# Patient Record
Sex: FEMALE | HISPANIC OR LATINO | Employment: FULL TIME | URBAN - METROPOLITAN AREA
[De-identification: names, ages, dates, MRNs, and addresses within clinical notes are randomized per-mention and may not be internally consistent; named-entity substitution may affect disease eponyms.]

---

## 2019-01-22 ENCOUNTER — OFFICE VISIT (OUTPATIENT)
Dept: OBGYN | Facility: CLINIC | Age: 54
End: 2019-01-22
Payer: COMMERCIAL

## 2019-01-22 VITALS
WEIGHT: 164.7 LBS | DIASTOLIC BLOOD PRESSURE: 73 MMHG | BODY MASS INDEX: 26.47 KG/M2 | HEART RATE: 88 BPM | SYSTOLIC BLOOD PRESSURE: 106 MMHG | HEIGHT: 66 IN

## 2019-01-22 DIAGNOSIS — R10.2 PELVIC PAIN IN FEMALE: Primary | ICD-10-CM

## 2019-01-22 PROCEDURE — T1013 SIGN LANG/ORAL INTERPRETER: HCPCS | Mod: U3,ZF

## 2019-01-22 PROCEDURE — G0463 HOSPITAL OUTPT CLINIC VISIT: HCPCS

## 2019-01-22 SDOH — HEALTH STABILITY: MENTAL HEALTH: HOW OFTEN DO YOU HAVE A DRINK CONTAINING ALCOHOL?: NEVER

## 2019-01-22 ASSESSMENT — MIFFLIN-ST. JEOR: SCORE: 1378.82

## 2019-01-22 NOTE — NURSING NOTE
Chief Complaint   Patient presents with     Follow Up     pt has abdominal pain was [facility]

## 2019-01-22 NOTE — PATIENT INSTRUCTIONS
It was nice to meet you today. Please schedule a pelvic ultrasound at your convenience. Make follow-up appointment for after your ultrasound.

## 2019-01-22 NOTE — PROGRESS NOTES
Progress Note    SUBJECTIVE:  Marielena Ramirez is a 53 year old,  who is here for pelvic pain and possible ovarian cyst. She was referred from [facility] due to ultrasound findings, although report is unavailable today. She describes 4 years of pelvic pain that originates in the left lower quadrant of her abdomen and radiates across her lower pelvis. She describes vaginal pain as well. She also reports foul smelling discharge with vaginal itching. She is not sexually active due to pain and she reports that her spouse left within the last 3-4 months. She is not currently taking medications for pain or using estrogen cream on her vagina. She finds some relief with warm herbal teas. She reports poor appetite, 3 pound weight loss, and mood disruption due to concerns over her pain. She denies CP, SOB, night sweats, N/V/D, constipation, or urinary symptoms. She reports recent mammogram and colonoscopy at outside facility. She denies abnormal PAP. Her last menses was approximately 1 year ago.     A  assisted with this visit.    Menstrual History:  LMP approximately 1 year ago.    Last  Pap: 4/30/2018 NILM    Last No results found for: HPV16  Last No results found for: HPV18  Last No results found for: HRHPV    Last Mammogram: 10/2017  No evidence of malignancy    Last Colonoscopy: 6/2018  No evidence of malignancy. Repeat in 5 years    HISTORY:    No Known Allergies    There is no immunization history on file for this patient.    OB History   S/p vaginal delivery x5     History reviewed. No pertinent past medical history.  Past Surgical History:   Procedure Laterality Date     TUBAL LIGATION  2008     Family History   Problem Relation Age of Onset     Cancer Mother      Social History     Socioeconomic History     Marital status: Legally      Spouse name: None     Number of children: None     Years of education: None     Highest education level: None   Social Needs     Financial  "resource strain: None     Food insecurity - worry: None     Food insecurity - inability: None     Transportation needs - medical: None     Transportation needs - non-medical: None   Occupational History     None   Tobacco Use     Smoking status: Never Smoker     Smokeless tobacco: Never Used   Substance and Sexual Activity     Alcohol use: No     Frequency: Never     Drug use: No     Sexual activity: Not Currently   Other Topics Concern     None   Social History Narrative     None       ROS  10 point ROS neg other than the symptoms noted above in the HPI.    EXAM:  Blood pressure 106/73, pulse 88, height 1.676 m (5' 6\"), weight 74.7 kg (164 lb 11.2 oz), not currently breastfeeding. Body mass index is 26.58 kg/m .  General - no acute distress.  Skin - no suspicious lesions or rashes  Lungs - no increased work of breathing  Abdomen - soft, tender to palpation in lower quadrants, nondistended, no masses or organomegaly noted.    Pelvic Exam:  EG/BUS: Normal genital architecture without lesions, erythema or abnormal secretions Bartholin's, Urethra, [place]'s normal   Urethral meatus: normal   Urethra: no masses, tenderness, or scarring   Bladder: no masses or tenderness   Vagina: mildly atrophic,with creamy, white and odorless  secretions  Cervix: no lesions  Uterus: retroverted,  and exam findings compromised by body habitus  Adnexa: Within normal limits and No masses, nodularity, mildly tender to palpation  Rectum:anus normal     Wet Prep negative    ASSESSMENT/PLAN: 54yo  with pelvic pain. Per patient has history of ovarian cyst on ultrasound. Patient signed release of information to obtain records from [facility]. Will plan for repeat TVUS with plan for f/u visit for ultrasound results. Patient with thorough previous workup for GI etiology of pain negative. Patient with significant stress regarding fear of something wrong. Patient demonstrates understanding of plan and will schedule US and f/u visit. "       Encounter Diagnosis   Name Primary?     Pelvic pain in female Yes        Orders Placed This Encounter   Procedures     US Pelvic Complete w Transvaginal      Patient discussed with Dr. Darnell Alicia Myhre, DO  Obstetrics and Gyncology, PGY-2  January 22, 2019 , 8:15 PM     The Patient was seen in [facility] by    MYHRE, ALICIA REYES, REBECCA.  I reviewed the history & exam. Assessment and plan were jointly made.    Olinda Arechiga MD

## 2019-04-17 ENCOUNTER — ANCILLARY PROCEDURE (OUTPATIENT)
Dept: ULTRASOUND IMAGING | Facility: CLINIC | Age: 54
End: 2019-04-17
Payer: COMMERCIAL

## 2019-04-17 ENCOUNTER — OFFICE VISIT (OUTPATIENT)
Dept: OBGYN | Facility: CLINIC | Age: 54
End: 2019-04-17
Payer: COMMERCIAL

## 2019-04-17 VITALS
SYSTOLIC BLOOD PRESSURE: 116 MMHG | DIASTOLIC BLOOD PRESSURE: 73 MMHG | HEART RATE: 76 BPM | WEIGHT: 165 LBS | HEIGHT: 66 IN | BODY MASS INDEX: 26.52 KG/M2

## 2019-04-17 DIAGNOSIS — R10.2 PELVIC PAIN IN FEMALE: Primary | ICD-10-CM

## 2019-04-17 PROCEDURE — G0463 HOSPITAL OUTPT CLINIC VISIT: HCPCS | Mod: 25,ZF

## 2019-04-17 PROCEDURE — T1013 SIGN LANG/ORAL INTERPRETER: HCPCS | Mod: U3,ZF

## 2019-04-17 PROCEDURE — 76830 TRANSVAGINAL US NON-OB: CPT

## 2019-04-17 RX ORDER — OMEPRAZOLE 20 MG/1
20 TABLET, DELAYED RELEASE ORAL
COMMUNITY
Start: 2018-11-06 | End: 2019-04-17

## 2019-04-17 ASSESSMENT — MIFFLIN-ST. JEOR: SCORE: 1380.19

## 2019-04-17 ASSESSMENT — PAIN SCALES - GENERAL: PAINLEVEL: SEVERE PAIN (6)

## 2019-04-17 NOTE — PATIENT INSTRUCTIONS
Nice to meet you today!  Your pelvic ultrasound was reassuring.   Please follow up in clinic as needed.

## 2019-04-17 NOTE — PROGRESS NOTES
"[facility]  Follow-Up Visit    S: Ms. Marielena Ramirez is a 53 year old  who presents today to discuss the results of a pelvic ultrasound performed due to pelvic pain. She reports her pain is improved. When she has pain she takes tylenol and drinks water with improvement in pain. No other concerns or complaints today.    An in person  was used for this encounter.     O:  /73   Pulse 76   Ht 1.676 m (5' 6\")   Wt 74.8 kg (165 lb)   Breastfeeding? No   BMI 26.63 kg/m    Gen: Well-appearing, NAD  HEENT: Normocephalic, atraumatic  CV:  Well perfused  Pulm: No increased work of breathing    A/P:  Ms. Marielena Ramirez is a 53 year old  who presents to dicussed pelvic US performed for pelvic pain.      Pelvic US was reassuring. A bulky uterus with possible adenomyosis was noted. Discussed that adenomyosis can cause discomfort during menses. Patient was reassured by overall normal pelvic ultrasound. She had no other complaints or concerns. She was encouraged to follow up in clinic as needed.     Patient to follow up in clinic as needed.     Staffed with Dr. Parra.    Magda Fragoso MD PhD  Ob/Gyn, PGY-3  4/17/2019, 3:26 PM    The Patient was seen in [facility] by    MAGDA FRAGOSO JUDITH.  I reviewed the history & exam. Assessment and plan were jointly made.    Allie Parra MD      "

## 2021-12-16 ENCOUNTER — APPOINTMENT (OUTPATIENT)
Dept: INTERPRETER SERVICES | Facility: CLINIC | Age: 56
End: 2021-12-16
Payer: COMMERCIAL

## 2021-12-27 ENCOUNTER — PRE VISIT (OUTPATIENT)
Dept: UROLOGY | Facility: CLINIC | Age: 56
End: 2021-12-27
Payer: COMMERCIAL

## 2021-12-28 NOTE — TELEPHONE ENCOUNTER
MEDICAL RECORDS REQUEST   [facility]  Urology Clinic  [address] [place] [address]  PHONE: [phone]  Fax: [phone]        FUTURE VISIT INFORMATION                                                   Marielena Ramirez, : 1965 scheduled for future visit at [facility]    APPOINTMENT INFORMATION:    Date: 2022    Provider:  Renetta Briones MD    Reason for Visit/Diagnosis: Bladder cyst    REFERRAL INFORMATION:    Referring provider: N/A    Specialty: N/A    Referring providers clinic:  N/A    Clinic contact number:  N/A    RECORDS REQUESTED FOR VISIT                                                     NOTES  STATUS/DETAILS   OFFICE NOTE from referring provider  no   OFFICE NOTE from other specialist  yes, Byron Parada MD     DISCHARGE SUMMARY from hospital  no   DISCHARGE REPORT from the ER  yes, 10/27/2021, 2021   OPERATIVE REPORT  yes, 2021   MEDICATION LIST  yes   LABS     URINALYSIS (UA)  yes   URINE CYTOLOGY  no   IMAGING (IMAGES & REPORT)  pending  2021, 10/27/2021, 2021, 2020 -- CT ABD/P -- [facility]    2021 -- MR Pelvis -- [facility]    2021 -- CT ABD/P -- [facility]     PRE-VISIT CHECKLIST      Record collection complete If no, please explain pending   Appointment appropriately scheduled           (right time/right provider) Yes   Joint diagnostic appointment coordinated correctly          (ensure right order & amount of time) Yes   MyChart activation No   Questionnaire complete If no, please explain pending     Action 2021 KATARINATNATE 8:44am   Action Taken CSS sent a request to [facility] for images to be pushed to [facility].

## 2021-12-30 ENCOUNTER — PRE VISIT (OUTPATIENT)
Dept: UROLOGY | Facility: CLINIC | Age: 56
End: 2021-12-30
Payer: COMMERCIAL

## 2021-12-30 NOTE — TELEPHONE ENCOUNTER
Reason for visit: Urethral diverticulum, mixed incontinence      Relevant information: cystic appearing bladder seen on cystoscopy by referring provider    Records/imaging/labs/orders: records available    Pt called: no need for a call    At Rooming: collect a urine/pvr

## 2022-01-20 ENCOUNTER — DOCUMENTATION ONLY (OUTPATIENT)
Dept: UROLOGY | Facility: CLINIC | Age: 57
End: 2022-01-20

## 2022-01-20 ENCOUNTER — OFFICE VISIT (OUTPATIENT)
Dept: UROLOGY | Facility: CLINIC | Age: 57
End: 2022-01-20
Payer: COMMERCIAL

## 2022-01-20 VITALS
WEIGHT: 175 LBS | DIASTOLIC BLOOD PRESSURE: 79 MMHG | HEIGHT: 67 IN | HEART RATE: 72 BPM | SYSTOLIC BLOOD PRESSURE: 123 MMHG | BODY MASS INDEX: 27.47 KG/M2

## 2022-01-20 DIAGNOSIS — N36.1 URETHRAL DIVERTICULUM: ICD-10-CM

## 2022-01-20 DIAGNOSIS — M62.89 PELVIC FLOOR DYSFUNCTION IN FEMALE: ICD-10-CM

## 2022-01-20 DIAGNOSIS — N32.9 LESION OF BLADDER: ICD-10-CM

## 2022-01-20 DIAGNOSIS — N39.46 MIXED INCONTINENCE: Primary | ICD-10-CM

## 2022-01-20 DIAGNOSIS — M79.18 MYALGIA OF PELVIC FLOOR: ICD-10-CM

## 2022-01-20 LAB
ALBUMIN UR-MCNC: NEGATIVE MG/DL
APPEARANCE UR: CLEAR
BILIRUB UR QL STRIP: NEGATIVE
COLOR UR AUTO: YELLOW
GLUCOSE UR STRIP-MCNC: NEGATIVE MG/DL
HGB UR QL STRIP: NEGATIVE
KETONES UR STRIP-MCNC: NEGATIVE MG/DL
LEUKOCYTE ESTERASE UR QL STRIP: ABNORMAL
NITRATE UR QL: NEGATIVE
PH UR STRIP: 7 [PH] (ref 5–8)
SP GR UR STRIP: >=1.03 (ref 1–1.03)
UROBILINOGEN UR STRIP-ACNC: 0.2 E.U./DL

## 2022-01-20 PROCEDURE — 51798 US URINE CAPACITY MEASURE: CPT | Performed by: UROLOGY

## 2022-01-20 PROCEDURE — 99203 OFFICE O/P NEW LOW 30 MIN: CPT | Mod: 25 | Performed by: UROLOGY

## 2022-01-20 PROCEDURE — 52000 CYSTOURETHROSCOPY: CPT | Performed by: UROLOGY

## 2022-01-20 RX ORDER — IBUPROFEN 600 MG/1
600 TABLET, FILM COATED ORAL
COMMUNITY
Start: 2021-12-20

## 2022-01-20 RX ORDER — CEFAZOLIN SODIUM 2 G/50ML
2 SOLUTION INTRAVENOUS
Status: CANCELLED | OUTPATIENT
Start: 2022-01-20

## 2022-01-20 RX ORDER — CEFAZOLIN SODIUM 2 G/50ML
2 SOLUTION INTRAVENOUS SEE ADMIN INSTRUCTIONS
Status: CANCELLED | OUTPATIENT
Start: 2022-01-20

## 2022-01-20 ASSESSMENT — PAIN SCALES - GENERAL: PAINLEVEL: EXTREME PAIN (8)

## 2022-01-20 ASSESSMENT — MIFFLIN-ST. JEOR: SCORE: 1426.42

## 2022-01-20 NOTE — NURSING NOTE
"Chief Complaint   Patient presents with     Consult     Urethral diverticulum, mixed incontinence         Blood pressure 123/79, pulse 72, height 1.702 m (5' 7\"), weight 79.4 kg (175 lb), not currently breastfeeding. Body mass index is 27.41 kg/m .    There is no problem list on file for this patient.      Allergies   Allergen Reactions     Fish-Derived Products Itching       Current Outpatient Medications   Medication Sig Dispense Refill     ibuprofen (ADVIL/MOTRIN) 600 MG tablet Take 600 mg by mouth         Social History     Tobacco Use     Smoking status: Never Smoker     Smokeless tobacco: Never Used   Substance Use Topics     Alcohol use: No     Drug use: No       Rose Jimenez, EMT  1/20/2022  8:10 AM  "

## 2022-01-20 NOTE — TELEPHONE ENCOUNTER
FUTURE VISIT INFORMATION      SURGERY INFORMATION:    Date: 22    Location: [facility] or    Surgeon:  Renetta Briones MD    Anesthesia Type:  choice    Procedure: EXAM UNDER ANESTHESIA, CYSTOSCOPY, WITH BLADDER BIOPSY    Consult: ov 22    RECORDS REQUESTED FROM:       Primary Care Provider: Solitario Rivera MD  [facility]    Most recent EKG+ Tracin21- [facility]

## 2022-01-20 NOTE — LETTER
1/20/2022       RE: Marielena Ramirez  [address] [facility] [address]     Dear Colleague,    Thank you for referring your patient, Marielena Ramirez, to the [facility] [place] at [facility]. Please see a copy of my visit note below.    January 20, 2022    Referring Provider: Byron Parada MD  [address] [place] [address]    Primary Care Provider: No Ref-Primary, Physician    Assessment & Plan     Mixed incontinence  We discussed the etiologies of stress and urge incontinence and how they differ. We discussed that the options of treating stress incontinence to include observation, weight loss, pelvic floor physical therapy, incontinence pessary, urethral bulking agents, and surgical correction most commonly with midurethral sling or autologous rectus fascial sling. We then discussed that urge incontinence treatments include observation, weight loss, medications most commonly anticholinergics, physical therapy, biofeedback, intravesical botulinum toxin, percutaneous tibial nerve stimulation and sacral neuromodulation.     We discussed she is not a candidate for mesh sling at the time of a diverticula repair. Although she is very interested in a fascial sling at the time of a diverticulectomy repair given her exam findings and the fact that this diverticula may be more challenging given the location discussed that I am leaning towards staging procedures if needed    - Case Request: EXAM UNDER ANESTHESIA, CYSTOSCOPY, WITH BLADDER BIOPSY  - Case Request: EXAM UNDER ANESTHESIA, CYSTOSCOPY, WITH BLADDER BIOPSY    Urethral diverticulum    On exam and cystoscopy today, unable to visualize lumen of urethral diverticulum and unable to palpate diverticulum on pelvic exam. Will plan to have pt f/u for repeat cystoscopy and pelvic exam under anesthesia to better identify the diverticulum. Following that, could consider repeat MRI to  see if urethral diverticulum resolved or plan for surgical management of urethral diverticulum.    - Case Request: EXAM UNDER ANESTHESIA, CYSTOSCOPY, WITH BLADDER BIOPSY  - Case Request: EXAM UNDER ANESTHESIA, CYSTOSCOPY, WITH BLADDER BIOPSY    Myalgia of pelvic floor/Pelvic floor dysfunction in female    We discussed how her pelvic floor symptoms are related to the physical exam findings and her pelvic floor myofascial dysfunction.  We discussed how the recommended treatment is dedicated pelvic floor therapy but she wishes to hold off for now until after the procedure    - Case Request: EXAM UNDER ANESTHESIA, CYSTOSCOPY, WITH BLADDER BIOPSY  - Case Request: EXAM UNDER ANESTHESIA, CYSTOSCOPY, WITH BLADDER BIOPSY    Lesion of bladder    Since we are planning on going to the OR for a better exam will also plan to do bladder biopsy at that time to ensure no malignancy    - Case Request: EXAM UNDER ANESTHESIA, CYSTOSCOPY, WITH BLADDER BIOPSY  - Case Request: EXAM UNDER ANESTHESIA, CYSTOSCOPY, WITH BLADDER BIOPSY    Shari Dowling, MS4    40 minutes were spent today on the date of the encounter in reviewing the EMR including Dr Parada's notes from [facility], MRI results from [facility], direct patient care, coordination of care including surgical counseling and documentation in addition to the cystoscopy procedure    Renetta Briones MD MPH  (she/her/hers)   of Urology  [facility]      HPI:  Marielena Ramirez is a 56 year old female  (5xNSVD) with PMH of recurrent UTIs, GERD, vaginal atrophy, s/p umbilical hernia repair who presents for evaluation of her pelvic floor symptoms.  She was previously seen by urology on 12/15/21 and was referred to [facility] urology for further workup.  Patient is primarily [place] Islander speaking and the entire visit is conducted with the assistance of a     On 12/15/21 cystoscopy, the pt was found to have probably 50-60 isolated small 2mm lesions across  the surface of the bladder that look like cystitis cystica per Dr. Parada. They were not biopsied.    An MRI done on 11/2021, showed:   Impression: Large urethral diverticulum, with thick nodular wall showing    intense enhancement and restricted diffusion. Findings are concerning for    chronic infected/inflamed urethral diverticulum, versus malignancy.   The pt was then referred to [facility] urology d/t the complex nature of the diverticulum with a doughnut configuration wrapping dorsally around part of the urethra and may require a labial flap for urethral coverage.    Recurrent UTIs  5-6 UTIs in the past year, last UTI was one month ago. She is not using preventative antibiotics. She is rarely sexually active and does not notice association between symptom onset and UTI symptoms. She gets UTIs after eating fish.     Urinary incontinence  The pt has urinary leakage when laughing, coughing, and sneezing. She also has pain and burning when urinating associated with pressure in her lower abdomen. She notices blood in her urine about 3x/week and is about a few drops. She gets an urge to urinate when she drink water. Nocturia 3-4x/night. Urinates 8-9x during the day. She wears a thick menstrual pad and changes it about 3x a day. She notes the pads cause a rash and itching.    Vaginal Symptoms  She has a white discharge with a bad odor and associated itching that occurs when she is not taking antibiotics. She also has vaginal pressure and a bulge.    GI  The pt has a BM 3-4x/day which she reports is loose. She has constipation about once a week. She takes a laxative when she has constipation, unsure which laxative. No hematochezia, melena, and fecal incontinence.    Health maintenance screening:  Pap smear - pt reports last pap smear was 5 years ago at outside clinic, normal  Colonoscopy - never  Mammogram - never     No past medical history on file.    Past Surgical History:   Procedure Laterality Date     TUBAL LIGATION   "2008       Social History     Socioeconomic History     Marital status: Legally      Spouse name: Not on file     Number of children: Not on file     Years of education: Not on file     Highest education level: Not on file   Occupational History     Not on file   Tobacco Use     Smoking status: Never Smoker     Smokeless tobacco: Never Used   Substance and Sexual Activity     Alcohol use: No     Drug use: No     Sexual activity: Not Currently   Other Topics Concern     Not on file   Social History Narrative     Not on file     Social Determinants of Health     Financial Resource Strain: Not on file   Food Insecurity: Not on file   Transportation Needs: Not on file   Physical Activity: Not on file   Stress: Not on file   Social Connections: Not on file   Intimate Partner Violence: Not on file   Housing Stability: Not on file       Family History   Problem Relation Age of Onset     Cancer Mother        ROS    Allergies   Allergen Reactions     Fish-Derived Products Itching       Current Outpatient Medications   Medication     ibuprofen (ADVIL/MOTRIN) 600 MG tablet     No current facility-administered medications for this visit.     /79   Pulse 72   Ht 1.702 m (5' 7\")   Wt 79.4 kg (175 lb)   BMI 27.41 kg/m      GENERAL: healthy, alert and no distress  RESP: breathing comfortably on room air  CV: well perfused   (female): anterior vaginal fullness with no discernable urethral diverticula, diffuse myofacial tenderness and high tone pelvic floor, normal female external genitalia, normal urethral meatus, vaginal mucosa, normal cervix/adnexa/uterus without masses or discharge  MS: no gross musculoskeletal defects noted, no edema    Cystoscopy Note: Given that she had to take time off today for the appointment and is really interested in surgery we elected to perform the cystoscopy today.  After informed consent was obtained patient was prepped and draped in the standard fashion.  The flexible cystoscope " was inserted into a normal appearing urethral meatus.  The urothelium was carefully examined and there was some trigone irregularity but no obviosu tumors, masses, stones, foreign bodies, or other urothelial abnormalities noted.  Bilateral ureteral orifices were noted in the normal orthotopic position and both effluxed clear urine.  The cystoscope was retroflexed and the bladder neck was unremarkable.  The urethra was carefully examined upon removing the cystoscope and there was no obvious diverticular os.  Patient tolerated the procedure without complications noted.      Urine dip normal, trace leuk est     PVR 0 mL by bladder scan      CC  Patient Care Team:  No Ref-Primary, Physician as PCP - General  Myhre, Alicia, DO as Resident (Student in organized health care education/training program)  Renetta Briones MD as MD (Urology)  MARIA A MCQUEEN

## 2022-01-20 NOTE — PROGRESS NOTES
Action 01/20/2022 JTV 9:24am   Action Taken CSS sent an urgent request to [facility] for images to be pushed.

## 2022-01-20 NOTE — PROGRESS NOTES
January 20, 2022    Referring Provider: Byron Parada MD  [address] [place] [address]    Primary Care Provider: No Ref-Primary, Physician    Assessment & Plan     Mixed incontinence  We discussed the etiologies of stress and urge incontinence and how they differ. We discussed that the options of treating stress incontinence to include observation, weight loss, pelvic floor physical therapy, incontinence pessary, urethral bulking agents, and surgical correction most commonly with midurethral sling or autologous rectus fascial sling. We then discussed that urge incontinence treatments include observation, weight loss, medications most commonly anticholinergics, physical therapy, biofeedback, intravesical botulinum toxin, percutaneous tibial nerve stimulation and sacral neuromodulation.     We discussed she is not a candidate for mesh sling at the time of a diverticula repair. Although she is very interested in a fascial sling at the time of a diverticulectomy repair given her exam findings and the fact that this diverticula may be more challenging given the location discussed that I am leaning towards staging procedures if needed    - Case Request: EXAM UNDER ANESTHESIA, CYSTOSCOPY, WITH BLADDER BIOPSY  - Case Request: EXAM UNDER ANESTHESIA, CYSTOSCOPY, WITH BLADDER BIOPSY    Urethral diverticulum    On exam and cystoscopy today, unable to visualize lumen of urethral diverticulum and unable to palpate diverticulum on pelvic exam. Will plan to have pt f/u for repeat cystoscopy and pelvic exam under anesthesia to better identify the diverticulum. Following that, could consider repeat MRI to see if urethral diverticulum resolved or plan for surgical management of urethral diverticulum.    - Case Request: EXAM UNDER ANESTHESIA, CYSTOSCOPY, WITH BLADDER BIOPSY  - Case Request: EXAM UNDER ANESTHESIA, CYSTOSCOPY, WITH BLADDER BIOPSY    Myalgia of pelvic floor/Pelvic floor dysfunction in female    We discussed  how her pelvic floor symptoms are related to the physical exam findings and her pelvic floor myofascial dysfunction.  We discussed how the recommended treatment is dedicated pelvic floor therapy but she wishes to hold off for now until after the procedure    - Case Request: EXAM UNDER ANESTHESIA, CYSTOSCOPY, WITH BLADDER BIOPSY  - Case Request: EXAM UNDER ANESTHESIA, CYSTOSCOPY, WITH BLADDER BIOPSY    Lesion of bladder    Since we are planning on going to the OR for a better exam will also plan to do bladder biopsy at that time to ensure no malignancy    - Case Request: EXAM UNDER ANESTHESIA, CYSTOSCOPY, WITH BLADDER BIOPSY  - Case Request: EXAM UNDER ANESTHESIA, CYSTOSCOPY, WITH BLADDER BIOPSY    Shari Dowling, MS4    40 minutes were spent today on the date of the encounter in reviewing the EMR including Dr Parada's notes from [facility], MRI results from [facility], direct patient care, coordination of care including surgical counseling and documentation in addition to the cystoscopy procedure    Renetta Briones MD MPH  (she/her/hers)   of Urology  [facility]      HPI:  Marielena Ramirez is a 56 year old female  (5xNSVD) with PMH of recurrent UTIs, GERD, vaginal atrophy, s/p umbilical hernia repair who presents for evaluation of her pelvic floor symptoms.  She was previously seen by urology on 12/15/21 and was referred to [facility] urology for further workup.  Patient is primarily Greek speaking and the entire visit is conducted with the assistance of a     On 12/15/21 cystoscopy, the pt was found to have probably 50-60 isolated small 2mm lesions across the surface of the bladder that look like cystitis cystica per Dr. Parada. They were not biopsied.    An MRI done on 2021, showed:   Impression: Large urethral diverticulum, with thick nodular wall showing    intense enhancement and restricted diffusion. Findings are concerning for    chronic infected/inflamed  urethral diverticulum, versus malignancy.   The pt was then referred to [facility] urology d/t the complex nature of the diverticulum with a doughnut configuration wrapping dorsally around part of the urethra and may require a labial flap for urethral coverage.    Recurrent UTIs  5-6 UTIs in the past year, last UTI was one month ago. She is not using preventative antibiotics. She is rarely sexually active and does not notice association between symptom onset and UTI symptoms. She gets UTIs after eating fish.     Urinary incontinence  The pt has urinary leakage when laughing, coughing, and sneezing. She also has pain and burning when urinating associated with pressure in her lower abdomen. She notices blood in her urine about 3x/week and is about a few drops. She gets an urge to urinate when she drink water. Nocturia 3-4x/night. Urinates 8-9x during the day. She wears a thick menstrual pad and changes it about 3x a day. She notes the pads cause a rash and itching.    Vaginal Symptoms  She has a white discharge with a bad odor and associated itching that occurs when she is not taking antibiotics. She also has vaginal pressure and a bulge.    GI  The pt has a BM 3-4x/day which she reports is loose. She has constipation about once a week. She takes a laxative when she has constipation, unsure which laxative. No hematochezia, melena, and fecal incontinence.    Health maintenance screening:  Pap smear - pt reports last pap smear was 5 years ago at outside clinic, normal  Colonoscopy - never  Mammogram - never     No past medical history on file.    Past Surgical History:   Procedure Laterality Date     TUBAL LIGATION  2008       Social History     Socioeconomic History     Marital status: Legally      Spouse name: Not on file     Number of children: Not on file     Years of education: Not on file     Highest education level: Not on file   Occupational History     Not on file   Tobacco Use     Smoking status: Never  "Smoker     Smokeless tobacco: Never Used   Substance and Sexual Activity     Alcohol use: No     Drug use: No     Sexual activity: Not Currently   Other Topics Concern     Not on file   Social History Narrative     Not on file     Social Determinants of Health     Financial Resource Strain: Not on file   Food Insecurity: Not on file   Transportation Needs: Not on file   Physical Activity: Not on file   Stress: Not on file   Social Connections: Not on file   Intimate Partner Violence: Not on file   Housing Stability: Not on file       Family History   Problem Relation Age of Onset     Cancer Mother        ROS    Allergies   Allergen Reactions     Fish-Derived Products Itching       Current Outpatient Medications   Medication     ibuprofen (ADVIL/MOTRIN) 600 MG tablet     No current facility-administered medications for this visit.     /79   Pulse 72   Ht 1.702 m (5' 7\")   Wt 79.4 kg (175 lb)   BMI 27.41 kg/m      GENERAL: healthy, alert and no distress  RESP: breathing comfortably on room air  CV: well perfused   (female): anterior vaginal fullness with no discernable urethral diverticula, diffuse myofacial tenderness and high tone pelvic floor, normal female external genitalia, normal urethral meatus, vaginal mucosa, normal cervix/adnexa/uterus without masses or discharge  MS: no gross musculoskeletal defects noted, no edema    Cystoscopy Note: Given that she had to take time off today for the appointment and is really interested in surgery we elected to perform the cystoscopy today.  After informed consent was obtained patient was prepped and draped in the standard fashion.  The flexible cystoscope was inserted into a normal appearing urethral meatus.  The urothelium was carefully examined and there was some trigone irregularity but no obviosu tumors, masses, stones, foreign bodies, or other urothelial abnormalities noted.  Bilateral ureteral orifices were noted in the normal orthotopic position and " both effluxed clear urine.  The cystoscope was retroflexed and the bladder neck was unremarkable.  The urethra was carefully examined upon removing the cystoscope and there was no obvious diverticular os.  Patient tolerated the procedure without complications noted.      Urine dip normal, trace leuk est     PVR 0 mL by bladder scan      CC  Patient Care Team:  No Ref-Primary, Physician as PCP - General  Myhre, Alicia, DO as Resident (Student in organized health care education/training program)  Renetta Briones MD as MD (Urology)  MARIA A MCQUEEN

## 2022-01-26 DIAGNOSIS — Z11.59 ENCOUNTER FOR SCREENING FOR OTHER VIRAL DISEASES: Primary | ICD-10-CM

## 2022-01-31 ENCOUNTER — ANESTHESIA EVENT (OUTPATIENT)
Dept: SURGERY | Facility: CLINIC | Age: 57
End: 2022-01-31

## 2022-01-31 ENCOUNTER — PRE VISIT (OUTPATIENT)
Dept: SURGERY | Facility: CLINIC | Age: 57
End: 2022-01-31

## 2022-01-31 DIAGNOSIS — Z01.812 PRE-PROCEDURE LAB EXAM: Primary | ICD-10-CM

## 2022-01-31 NOTE — ANESTHESIA PREPROCEDURE EVALUATION
Anesthesia Pre-Procedure Evaluation    Patient: Marielena Ramirez   MRN: [id number] : 1965        Preoperative Diagnosis: * No surgery found *    Procedure :   PAC EVALUATION       Past Medical History:   Diagnosis Date     Recurrent UTI       Past Surgical History:   Procedure Laterality Date     HERNIA REPAIR       TUBAL LIGATION  2008      Allergies   Allergen Reactions     Fish-Derived Products Itching      Social History     Tobacco Use     Smoking status: Never Smoker     Smokeless tobacco: Never Used   Substance Use Topics     Alcohol use: No      Wt Readings from Last 1 Encounters:   22 79.4 kg (175 lb)        Anesthesia Evaluation   Pt has had prior anesthetic.         ROS/MED HX  ENT/Pulmonary:       Neurologic:       Cardiovascular:     (+) -----Previous cardiac testing   Echo: Date: Results:    Stress Test: Date: Results:    ECG Reviewed: Date: 21 Results:  SB  Cath: Date: Results:      METS/Exercise Tolerance:     Hematologic:       Musculoskeletal:       GI/Hepatic:       Renal/Genitourinary: Comment: Recurrent uti    incontinence      Endo:       Psychiatric/Substance Use:       Infectious Disease:       Malignancy:       Other:               OUTSIDE LABS:  CBC: No results found for: WBC, HGB, HCT, PLT  BMP: No results found for: NA, POTASSIUM, CHLORIDE, CO2, BUN, CR, GLC  COAGS: No results found for: PTT, INR, FIBR  POC: No results found for: BGM, HCG, HCGS  HEPATIC: No results found for: ALBUMIN, PROTTOTAL, ALT, AST, GGT, ALKPHOS, BILITOTAL, BILIDIRECT, LIZETTE  OTHER: No results found for: PH, LACT, A1C, BERE, PHOS, MAG, LIPASE, AMYLASE, TSH, T4, T3, CRP, SED          PAC Discussion and Assessment                          PAC Resident/NP Anesthesia Assessment: Opened in error.  Pt did not show up for PAC evaluation.                                         Caryn Dick PA-C

## 2022-01-31 NOTE — TELEPHONE ENCOUNTER
FUTURE VISIT INFORMATION        SURGERY INFORMATION:    Date: 22    [facility]    Surgeon:  Renetta Briones MD    Anesthesia Type:  choice    Procedure: EXAM UNDER ANESTHESIA, CYSTOSCOPY, WITH BLADDER BIOPSY    Consult: ov 22     RECORDS REQUESTED FROM:         Primary Care Provider: Solitario Rivera MD  [facility]     Most recent EKG+ Tracin21- [facility]

## 2022-02-01 ENCOUNTER — PATIENT OUTREACH (OUTPATIENT)
Dept: UROLOGY | Facility: CLINIC | Age: 57
End: 2022-02-01
Payer: COMMERCIAL

## 2022-02-01 NOTE — TELEPHONE ENCOUNTER
Left message to have the patient call me. I want to know if she completed a H & P at her primary's office since she missed her PAC visit yesterday.    Assistance provided by .    Sheri Ospina RN, BSN  Care Coordinator Urology

## 2022-02-03 ENCOUNTER — PRE VISIT (OUTPATIENT)
Dept: SURGERY | Facility: CLINIC | Age: 57
End: 2022-02-03
Payer: COMMERCIAL

## 2022-02-03 ENCOUNTER — LAB (OUTPATIENT)
Dept: LAB | Facility: CLINIC | Age: 57
End: 2022-02-03
Attending: UROLOGY
Payer: COMMERCIAL

## 2022-02-03 ENCOUNTER — ANESTHESIA EVENT (OUTPATIENT)
Dept: SURGERY | Facility: AMBULATORY SURGERY CENTER | Age: 57
End: 2022-02-03
Payer: COMMERCIAL

## 2022-02-03 ENCOUNTER — OFFICE VISIT (OUTPATIENT)
Dept: SURGERY | Facility: CLINIC | Age: 57
End: 2022-02-03
Payer: COMMERCIAL

## 2022-02-03 VITALS
DIASTOLIC BLOOD PRESSURE: 77 MMHG | BODY MASS INDEX: 26.68 KG/M2 | RESPIRATION RATE: 16 BRPM | SYSTOLIC BLOOD PRESSURE: 112 MMHG | TEMPERATURE: 97.9 F | HEART RATE: 79 BPM | HEIGHT: 67 IN | OXYGEN SATURATION: 97 % | WEIGHT: 170 LBS

## 2022-02-03 DIAGNOSIS — Z01.812 PRE-PROCEDURE LAB EXAM: ICD-10-CM

## 2022-02-03 DIAGNOSIS — Z11.59 ENCOUNTER FOR SCREENING FOR OTHER VIRAL DISEASES: ICD-10-CM

## 2022-02-03 DIAGNOSIS — Z01.818 PREOP EXAMINATION: Primary | ICD-10-CM

## 2022-02-03 LAB
ALBUMIN UR-MCNC: NEGATIVE MG/DL
APPEARANCE UR: CLEAR
BILIRUB UR QL STRIP: NEGATIVE
COLOR UR AUTO: YELLOW
GLUCOSE UR STRIP-MCNC: NEGATIVE MG/DL
HGB UR QL STRIP: NEGATIVE
KETONES UR STRIP-MCNC: NEGATIVE MG/DL
LEUKOCYTE ESTERASE UR QL STRIP: ABNORMAL
MUCOUS THREADS #/AREA URNS LPF: PRESENT /LPF
NITRATE UR QL: NEGATIVE
PH UR STRIP: 7 [PH] (ref 5–7)
RBC URINE: 1 /HPF
SP GR UR STRIP: 1.01 (ref 1–1.03)
SQUAMOUS EPITHELIAL: 2 /HPF
UROBILINOGEN UR STRIP-MCNC: NORMAL MG/DL
WBC URINE: 4 /HPF

## 2022-02-03 PROCEDURE — U0005 INFEC AGEN DETEC AMPLI PROBE: HCPCS | Performed by: UROLOGY

## 2022-02-03 PROCEDURE — 99204 OFFICE O/P NEW MOD 45 MIN: CPT | Performed by: PHYSICIAN ASSISTANT

## 2022-02-03 PROCEDURE — 81001 URINALYSIS AUTO W/SCOPE: CPT | Performed by: PATHOLOGY

## 2022-02-03 PROCEDURE — 87086 URINE CULTURE/COLONY COUNT: CPT | Performed by: UROLOGY

## 2022-02-03 PROCEDURE — T1013 SIGN LANG/ORAL INTERPRETER: HCPCS | Mod: U3

## 2022-02-03 RX ORDER — ACETAMINOPHEN 325 MG/1
325-650 TABLET ORAL EVERY 6 HOURS PRN
COMMUNITY

## 2022-02-03 ASSESSMENT — MIFFLIN-ST. JEOR: SCORE: 1403.74

## 2022-02-03 ASSESSMENT — ENCOUNTER SYMPTOMS: SEIZURES: 0

## 2022-02-03 ASSESSMENT — PAIN SCALES - GENERAL: PAINLEVEL: SEVERE PAIN (7)

## 2022-02-03 ASSESSMENT — LIFESTYLE VARIABLES: TOBACCO_USE: 0

## 2022-02-03 NOTE — ANESTHESIA PREPROCEDURE EVALUATION
Anesthesia Pre-Procedure Evaluation    Patient: Marielena Ramirez   MRN: [id number] : 1965        Preoperative Diagnosis: Mixed incontinence [N39.46]  Urethral diverticulum [N36.1]  Myalgia of pelvic floor [M79.18]  Pelvic floor dysfunction in female [M62.89]  Lesion of bladder [N32.9]    Procedure : Procedure(s):  EXAM UNDER ANESTHESIA, CYSTOSCOPY, WITH BLADDER BIOPSY  PAC EVALUATION       Past Medical History:   Diagnosis Date     Gastroesophageal reflux disease without esophagitis      Lesion of bladder      Mixed incontinence      Myalgia of pelvic floor      Pelvic floor dysfunction in female      Recurrent UTI      Umbilical hernia without obstruction and without gangrene      Urethral diverticulum      Vaginal atrophy       Past Surgical History:   Procedure Laterality Date     CHOLECYSTECTOMY      10/2021     HERNIA REPAIR  10/25/2021    Umbilical hernia     TUBAL LIGATION        Allergies   Allergen Reactions     Fish-Derived Products Itching      Social History     Tobacco Use     Smoking status: Never Smoker     Smokeless tobacco: Never Used   Substance Use Topics     Alcohol use: No      Wt Readings from Last 1 Encounters:   22 77.1 kg (170 lb)        Anesthesia Evaluation   Pt has had prior anesthetic.     History of anesthetic complications  - PONV.      ROS/MED HX  ENT/Pulmonary:  - neg pulmonary ROS  (-) tobacco use, asthma and sleep apnea   Neurologic:  - neg neurologic ROS  (-) no seizures and no CVA   Cardiovascular:  - neg cardiovascular ROS     METS/Exercise Tolerance: >4 METS    Hematologic:  - neg hematologic  ROS  (-) history of blood clots and history of blood transfusion   Musculoskeletal:  - neg musculoskeletal ROS     GI/Hepatic: Comment: S/P cholecystectomy 10/2021  S/p umbilical hernia repair 10/2021    (+) GERD, Asymptomatic on medication,  (-) liver disease   Renal/Genitourinary: Comment: Recurrent UTIs  Bladder lesion  Urethral diverticulum  Pelvic floor  dysfunction   (-) renal disease   Endo:  - neg endo ROS  (-) Type II DM   Psychiatric/Substance Use:  - neg psychiatric ROS     Infectious Disease:  - neg infectious disease ROS     Malignancy:  - neg malignancy ROS     Other:  - neg other ROS          Physical Exam    Airway        Mallampati: II       Respiratory Devices and Support         Dental           Cardiovascular          Rhythm and rate: regular     Pulmonary           breath sounds clear to auscultation           OUTSIDE LABS:  CBC: No results found for: WBC, HGB, HCT, PLT  BMP: No results found for: NA, POTASSIUM, CHLORIDE, CO2, BUN, CR, GLC  COAGS: No results found for: PTT, INR, FIBR  POC: No results found for: BGM, HCG, HCGS  HEPATIC: No results found for: ALBUMIN, PROTTOTAL, ALT, AST, GGT, ALKPHOS, BILITOTAL, BILIDIRECT, LIZETTE  OTHER: No results found for: PH, LACT, A1C, BERE, PHOS, MAG, LIPASE, AMYLASE, TSH, T4, T3, CRP, SED    Anesthesia Plan    ASA Status:  2   NPO Status:  NPO Appropriate    Anesthesia Type: MAC.              Consents    Anesthesia Plan(s) and associated risks, benefits, and realistic alternatives discussed. Questions answered and patient/representative(s) expressed understanding.    - Discussed:     - Discussed with:  Patient         Postoperative Care            Comments:           H&P reviewed: Unable to attach H&P to encounter due to EHR limitations. H&P Update: appropriate H&P reviewed, patient examined. No interval changes since H&P (within 30 days).       PAC Discussion and Assessment                          PAC Resident/NP Anesthesia Assessment: See H&P                                       Ct Ricks PA-C

## 2022-02-03 NOTE — H&P
Pre-Operative H & P     CC:  Preoperative exam to assess for increased cardiopulmonary risk while undergoing surgery and anesthesia.    Date of Encounter: 2/3/2022  Primary Care Physician:  No Ref-Primary, Physician     Reason for Visit: Mixed incontinence; Urethral diverticulum; Myalgia of pelvic floor; Pelvic floor dysfunction in female; Lesion of bladder    HPI  Marielena Ramirez is a 56 year old female who presents for pre-operative H & P in preparation for  Procedure Information     Case: [id number] Date/Time: 02/07/22 1025    Procedure: EXAM UNDER ANESTHESIA, CYSTOSCOPY, WITH BLADDER BIOPSY (N/A Urethra)    Anesthesia type: Choice    Diagnosis:       Mixed incontinence [N39.46]      Urethral diverticulum [N36.1]      Myalgia of pelvic floor [M79.18]      Pelvic floor dysfunction in female [M62.89]      Lesion of bladder [N32.9]    Pre-op diagnosis:       Mixed incontinence [N39.46]      Urethral diverticulum [N36.1]      Myalgia of pelvic floor [M79.18]      Pelvic floor dysfunction in female [M62.89]      Lesion of bladder [N32.9]    Location: [place] [address] / [facility]    Providers: Renetta Briones MD          Marielena Ramirez is a 55 y/o female who presents for pre-operative H&P in preparation for EXAM UNDER ANESTHESIA, CYSTOSCOPY, WITH BLADDER BIOPSY with Renetta Briones MD on 2/7/22 at [facility] for treatment of Mixed incontinence; Urethral diverticulum; Myalgia of pelvic floor; Pelvic floor dysfunction in female; Lesion of bladder.     Patient is being evaluated for comorbid conditions of GERD and s/p umbilical hernia repair.     Ms. Ramirez has a history of recurrent UTIs, vaginal atrophy and pelvic floor symptoms. She now presents for the above procedure.    History was obtained from patient via  & chart review.     Hx of abnormal bleeding or anti-platelet use: denies    Menstrual history: No LMP recorded.  Patient is postmenopausal.:       Past Medical History  Past Medical History:   Diagnosis Date     Gastroesophageal reflux disease without esophagitis      Lesion of bladder      Mixed incontinence      Myalgia of pelvic floor      Pelvic floor dysfunction in female      Recurrent UTI      Umbilical hernia without obstruction and without gangrene      Urethral diverticulum      Vaginal atrophy        Past Surgical History  Past Surgical History:   Procedure Laterality Date     CHOLECYSTECTOMY      10/2021     HERNIA REPAIR  10/25/2021    Umbilical hernia     TUBAL LIGATION  2008       Prior to Admission Medications  Current Outpatient Medications   Medication Sig Dispense Refill     acetaminophen (TYLENOL) 325 MG tablet Take 325-650 mg by mouth every 6 hours as needed for mild pain       esomeprazole (NEXIUM) 20 MG DR capsule Take 20 mg by mouth every morning (before breakfast) Take 30-60 minutes before eating.       ibuprofen (ADVIL/MOTRIN) 600 MG tablet Take 600 mg by mouth (Patient not taking: Reported on 2/3/2022)         Allergies  Allergies   Allergen Reactions     Fish-Derived Products Itching       Social History  Social History     Socioeconomic History     Marital status: Legally      Spouse name: Not on file     Number of children: Not on file     Years of education: Not on file     Highest education level: Not on file   Occupational History     Not on file   Tobacco Use     Smoking status: Never Smoker     Smokeless tobacco: Never Used   Substance and Sexual Activity     Alcohol use: No     Drug use: No     Sexual activity: Not Currently   Other Topics Concern     Not on file   Social History Narrative     Not on file     Social Determinants of Health     Financial Resource Strain: Not on file   Food Insecurity: Not on file   Transportation Needs: Not on file   Physical Activity: Not on file   Stress: Not on file   Social Connections: Not on file   Intimate Partner Violence: Not on file    Housing Stability: Not on file       Family History  Family History   Problem Relation Age of Onset     Cancer Mother      Heart Disease Sister         had heart surgery     Anesthesia Reaction No family hx of      Deep Vein Thrombosis (DVT) No family hx of        ROS/MED HX  The complete review of systems is negative other than noted in the HPI or here.  Patient denies recent illness, fever and respiratory infection during past month.  Pt denies steroid use during past year.    ENT/Pulmonary:  - neg pulmonary ROS  (-) tobacco use, asthma and sleep apnea   Neurologic:  - neg neurologic ROS  (-) no seizures and no CVA   Cardiovascular:  - neg cardiovascular ROS     METS/Exercise Tolerance: >4 METS    Hematologic:  - neg hematologic  ROS  (-) history of blood clots and history of blood transfusion   Musculoskeletal:  - neg musculoskeletal ROS     GI/Hepatic: Comment: S/P cholecystectomy 10/2021  S/p umbilical hernia repair 10/2021    (+) GERD, Asymptomatic on medication,  (-) liver disease   Renal/Genitourinary: Comment: Recurrent UTIs  Bladder lesion  Urethral diverticulum  Pelvic floor dysfunction   (-) renal disease   Endo:  - neg endo ROS  (-) Type II DM   Psychiatric/Substance Use:  - neg psychiatric ROS     Infectious Disease:  - neg infectious disease ROS     Malignancy:  - neg malignancy ROS     Other:  - neg other ROS          Physical Exam  Constitutional: Awake, alert, cooperative, no apparent distress, and appears stated age.  Eyes: Pupils equal, round and reactive to light, extra ocular muscles intact, sclera clear, conjunctiva normal.  HENT: Normocephalic, oral pharynx with moist mucus membranes, good dentition. No goiter appreciated. Upper denture in place.  Respiratory: Clear to auscultation bilaterally, no crackles or wheezing. No SOB when supine.  Cardiovascular: Regular rate and rhythm, normal S1 and S2, and no murmur noted.  Carotids +2, no bruits. No edema. Palpable pulses to radial, DP and PT  arteries.   GI: Normal bowel sounds, soft, non-distended, non-tender, no masses palpated.    Lymph/Hematologic: No cervical lymphadenopathy and no supraclavicular lymphadenopathy.  Genitourinary:  deferred  Skin: Warm and dry.  No rashes.   Musculoskeletal: full ROM of neck. There is no redness, warmth, or swelling of the joints. Gross motor strength is normal.    Neurologic: Awake, alert, oriented to name, place and time. Cranial nerves II-XII are grossly intact. Gait is normal. Ambulates from chair to exam table, seats self, lies supine and sits back up w/o assistance.  Neuropsychiatric: Calm, cooperative. Normal affect. Pleasant. Answers questions appropriately via , follows commands w/o difficulty.      PRIOR LABS/DIAGNOSTIC STUDIES:    All labs and imaging personally reviewed      CT abdomen and pelvis 12/2/21  Impression:   1. No new acute abnormality to explain patient's abdominal pain.   2. Postsurgical changes of umbilical hernia repair with near complete resolution of subcutaneous fluid at the surgical site.   3. Overall unchanged enhancing urethral diverticulum when compared to prior exam on 10/27/2021.   4. Hepatic steatosis.         LABS/DIAGNOSTIC STUDIES TODAY:  UA & COVID        The patient's records and results personally reviewed by this provider.       ASSESSMENT      Marielena Ramirez is a 56 year old female was seen as a PAC referral for risk assessment and optimization for anesthesia.    Plan/Recommendations  See below for details on the assessment, risk, and preoperative recommendations      ANESTHESIA  -  Pt has had prior anesthetic.     History of anesthetic complications:  PONV.    NEUROLOGY  - No history of TIA, CVA or seizure    ENT  - No current airway concerns.  Will need to be reassessed day of surgery.    CARDIAC  - METS >4   - RCRI : No serious cardiac risks.  0.4% risk of major adverse cardiac event.    PULMONARY  - LUAN 1/8 = low risk  - Denies asthma or inhaler use  -  Tobacco History      History   Smoking Status     Never Smoker   Smokeless Tobacco     Never Used       GI  - GERD, asymptomatic on Nexium   - S/P cholecystectomy 10/2021  - S/p umbilical hernia repair 10/2021    - Risk of PONV score = 3.  If > 2, anti-emetic intervention recommended.    /RENAL  - Recurrent UTIs  - Bladder lesion  - Urethral diverticulum  - Pelvic floor dysfunction     ENDOCRINE  - BMI:   Body mass index is 26.63 kg/m .   - No history of Diabetes Mellitus    HEME  - VTE risk: 0.26%  - No history of abnormal bleeding or antiplatelet use.    MSK  - full ROM of neck      The patient is optimized for their procedure. AVS with information on surgery time/arrival time, meds and NPO status given by nursing staff. No further diagnostic testing indicated.    Final plan per anesthesiologist on day of surgery.       On the day of service:     Prep time: 13 minutes  Visit time: 30 minutes  Documentation time: 10 minutes  ------------------------------------------  Total time: 53 minutes      Ct Ricks PA-C  Preoperative Assessment Center  [facility]  Clinic and [facility] Center  Phone: [phone]  Fax: [phone]

## 2022-02-03 NOTE — PATIENT INSTRUCTIONS
Preparing for Your Surgery      Name:  Marielena Ramirez   MRN:  [id number]   :  1965   Today's Date:  2/3/2022         Arriving for surgery:  Surgery date:  22  Arrival time:  8:55AM    Restrictions due to COVID 19:           parking is available for anyone with mobility limitations or disabilities. (Monday- Friday 7 am- 5 pm)    Please come to:    [facility]  [address] [place] [id number]    Please check in on the 5th floor at the Ambulatory Surgery Center       What can I eat or drink?    -  You may eat and drink normally until 8 hours before surgery. (Until 22, 2:25AM)  -  You may have clear liquids up to 4 hours before surgery. (Until 22, 6:25AM)  Examples of clear liquids:  Water  Clear broth  Juices (apple, white grape, white cranberry  and cider) without pulp  Noncarbonated, powder based beverages  (lemonade and Peewee-Aid)  Sodas (Sprite, 7-Up, ginger ale and seltzer)  Coffee or tea (without milk or cream)  Gatorade    --No alcohol for at least 24 hours before surgery    Which medicines can I take?    Hold Aspirin for 7 days before surgery.   Hold Multivitamins for 7 days before surgery.  Hold Supplements for 7 days before surgery.  Hold Ibuprofen (Advil, Motrin) for 1 day before surgery--unless otherwise directed by surgeon.  Hold Naproxen (Aleve) for 4 days before surgery.        -  PLEASE TAKE the following medications the day of surgery     Acetaminophen(Tylenol) as needed  Esomeprazole(Nexium)    How do I prepare myself?  - Please take 2 showers before surgery using Scrubcare or Hibiclens soap.    Use this soap only from the neck to your toes.     Leave the soap on your skin for one minute--then rinse thoroughly.      You may use your own shampoo and conditioner; no other hair products.   - Please remove all jewelry and body piercings.  - No lotions, deodorants or fragrance.  - No makeup or fingernail polish.   - Bring your ID and  insurance card.    -If you have a Deep Brain Stimulator, a Spinal Cord Stimulator or any implanted Neuro Device you must bring the remote to the Surgery Center         - All patients are required to have a Covid-19 test within 4 days of surgery/procedure.      -Patients will be contacted by the [facility] scheduling team within 1 week of surgery to make an appointment.      - Patients may call the Scheduling team at [phone] if they have not been scheduled within 4 days of  surgery.      ALL PATIENTS ARE REQUIRED TO HAVE A RESPONSIBLE ADULT TO DRIVE AND BE IN ATTENDANCE WITH THEM FOR 24 HOURS FOLLOWING SURGERY       Questions or Concerns:    -For questions regarding the day of surgery please contact the Ambulatory Surgery Center at [phone].    -If you have health changes between today and your surgery please contact your surgeon.     For questions after surgery please call your surgeons office.

## 2022-02-04 LAB
BACTERIA UR CULT: NO GROWTH
SARS-COV-2 RNA RESP QL NAA+PROBE: NEGATIVE

## 2022-02-04 RX ORDER — FENTANYL CITRATE 50 UG/ML
25 INJECTION, SOLUTION INTRAMUSCULAR; INTRAVENOUS
Status: CANCELLED | OUTPATIENT
Start: 2022-02-04

## 2022-02-07 ENCOUNTER — HOSPITAL ENCOUNTER (OUTPATIENT)
Facility: AMBULATORY SURGERY CENTER | Age: 57
End: 2022-02-07
Attending: UROLOGY
Payer: COMMERCIAL

## 2022-02-07 ENCOUNTER — ANESTHESIA (OUTPATIENT)
Dept: SURGERY | Facility: AMBULATORY SURGERY CENTER | Age: 57
End: 2022-02-07
Payer: COMMERCIAL

## 2022-02-07 VITALS
SYSTOLIC BLOOD PRESSURE: 107 MMHG | RESPIRATION RATE: 14 BRPM | OXYGEN SATURATION: 100 % | TEMPERATURE: 97.5 F | HEIGHT: 67 IN | HEART RATE: 77 BPM | BODY MASS INDEX: 26.68 KG/M2 | DIASTOLIC BLOOD PRESSURE: 50 MMHG | WEIGHT: 170 LBS

## 2022-02-07 DIAGNOSIS — N39.46 MIXED INCONTINENCE: ICD-10-CM

## 2022-02-07 DIAGNOSIS — M79.18 MYALGIA OF PELVIC FLOOR: ICD-10-CM

## 2022-02-07 DIAGNOSIS — M62.89 PELVIC FLOOR DYSFUNCTION IN FEMALE: ICD-10-CM

## 2022-02-07 DIAGNOSIS — N36.1 URETHRAL DIVERTICULUM: ICD-10-CM

## 2022-02-07 DIAGNOSIS — N32.9 LESION OF BLADDER: ICD-10-CM

## 2022-02-07 PROCEDURE — 52204 CYSTOSCOPY W/BIOPSY(S): CPT

## 2022-02-07 PROCEDURE — 88305 TISSUE EXAM BY PATHOLOGIST: CPT | Mod: TC | Performed by: UROLOGY

## 2022-02-07 PROCEDURE — 52204 CYSTOSCOPY W/BIOPSY(S): CPT | Mod: GC | Performed by: UROLOGY

## 2022-02-07 RX ORDER — OXYCODONE HYDROCHLORIDE 5 MG/1
5 TABLET ORAL EVERY 4 HOURS PRN
Status: DISCONTINUED | OUTPATIENT
Start: 2022-02-07 | End: 2022-02-08 | Stop reason: HOSPADM

## 2022-02-07 RX ORDER — LIDOCAINE 40 MG/G
CREAM TOPICAL
Status: DISCONTINUED | OUTPATIENT
Start: 2022-02-07 | End: 2022-02-07 | Stop reason: HOSPADM

## 2022-02-07 RX ORDER — CEFAZOLIN SODIUM 2 G/50ML
2 SOLUTION INTRAVENOUS SEE ADMIN INSTRUCTIONS
Status: DISCONTINUED | OUTPATIENT
Start: 2022-02-07 | End: 2022-02-07 | Stop reason: HOSPADM

## 2022-02-07 RX ORDER — HYDROMORPHONE HYDROCHLORIDE 1 MG/ML
0.2 INJECTION, SOLUTION INTRAMUSCULAR; INTRAVENOUS; SUBCUTANEOUS EVERY 5 MIN PRN
Status: DISCONTINUED | OUTPATIENT
Start: 2022-02-07 | End: 2022-02-08 | Stop reason: HOSPADM

## 2022-02-07 RX ORDER — ONDANSETRON 4 MG/1
4 TABLET, ORALLY DISINTEGRATING ORAL EVERY 30 MIN PRN
Status: DISCONTINUED | OUTPATIENT
Start: 2022-02-07 | End: 2022-02-08 | Stop reason: HOSPADM

## 2022-02-07 RX ORDER — LIDOCAINE HYDROCHLORIDE 20 MG/ML
INJECTION, SOLUTION INFILTRATION; PERINEURAL PRN
Status: DISCONTINUED | OUTPATIENT
Start: 2022-02-07 | End: 2022-02-07

## 2022-02-07 RX ORDER — FENTANYL CITRATE 50 UG/ML
25 INJECTION, SOLUTION INTRAMUSCULAR; INTRAVENOUS EVERY 5 MIN PRN
Status: DISCONTINUED | OUTPATIENT
Start: 2022-02-07 | End: 2022-02-08 | Stop reason: HOSPADM

## 2022-02-07 RX ORDER — KETOROLAC TROMETHAMINE 30 MG/ML
INJECTION, SOLUTION INTRAMUSCULAR; INTRAVENOUS PRN
Status: DISCONTINUED | OUTPATIENT
Start: 2022-02-07 | End: 2022-02-07

## 2022-02-07 RX ORDER — ONDANSETRON 2 MG/ML
INJECTION INTRAMUSCULAR; INTRAVENOUS PRN
Status: DISCONTINUED | OUTPATIENT
Start: 2022-02-07 | End: 2022-02-07

## 2022-02-07 RX ORDER — ONDANSETRON 2 MG/ML
4 INJECTION INTRAMUSCULAR; INTRAVENOUS EVERY 30 MIN PRN
Status: DISCONTINUED | OUTPATIENT
Start: 2022-02-07 | End: 2022-02-08 | Stop reason: HOSPADM

## 2022-02-07 RX ORDER — SODIUM CHLORIDE, SODIUM LACTATE, POTASSIUM CHLORIDE, CALCIUM CHLORIDE 600; 310; 30; 20 MG/100ML; MG/100ML; MG/100ML; MG/100ML
INJECTION, SOLUTION INTRAVENOUS CONTINUOUS
Status: DISCONTINUED | OUTPATIENT
Start: 2022-02-07 | End: 2022-02-08 | Stop reason: HOSPADM

## 2022-02-07 RX ORDER — LIDOCAINE HYDROCHLORIDE 20 MG/ML
JELLY TOPICAL PRN
Status: DISCONTINUED | OUTPATIENT
Start: 2022-02-07 | End: 2022-02-07 | Stop reason: HOSPADM

## 2022-02-07 RX ORDER — ACETAMINOPHEN 325 MG/1
975 TABLET ORAL ONCE
Status: COMPLETED | OUTPATIENT
Start: 2022-02-07 | End: 2022-02-07

## 2022-02-07 RX ORDER — PROPOFOL 10 MG/ML
INJECTION, EMULSION INTRAVENOUS CONTINUOUS PRN
Status: DISCONTINUED | OUTPATIENT
Start: 2022-02-07 | End: 2022-02-07

## 2022-02-07 RX ORDER — PROPOFOL 10 MG/ML
INJECTION, EMULSION INTRAVENOUS PRN
Status: DISCONTINUED | OUTPATIENT
Start: 2022-02-07 | End: 2022-02-07

## 2022-02-07 RX ORDER — SODIUM CHLORIDE, SODIUM LACTATE, POTASSIUM CHLORIDE, CALCIUM CHLORIDE 600; 310; 30; 20 MG/100ML; MG/100ML; MG/100ML; MG/100ML
INJECTION, SOLUTION INTRAVENOUS CONTINUOUS
Status: DISCONTINUED | OUTPATIENT
Start: 2022-02-07 | End: 2022-02-07 | Stop reason: HOSPADM

## 2022-02-07 RX ORDER — CEFAZOLIN SODIUM 2 G/50ML
2 SOLUTION INTRAVENOUS
Status: COMPLETED | OUTPATIENT
Start: 2022-02-07 | End: 2022-02-07

## 2022-02-07 RX ADMIN — PROPOFOL 20 MG: 10 INJECTION, EMULSION INTRAVENOUS at 11:08

## 2022-02-07 RX ADMIN — LIDOCAINE HYDROCHLORIDE 50 MG: 20 INJECTION, SOLUTION INFILTRATION; PERINEURAL at 10:37

## 2022-02-07 RX ADMIN — PROPOFOL 40 MG: 10 INJECTION, EMULSION INTRAVENOUS at 11:10

## 2022-02-07 RX ADMIN — KETOROLAC TROMETHAMINE 15 MG: 30 INJECTION, SOLUTION INTRAMUSCULAR; INTRAVENOUS at 10:56

## 2022-02-07 RX ADMIN — OXYCODONE HYDROCHLORIDE 5 MG: 5 TABLET ORAL at 11:41

## 2022-02-07 RX ADMIN — SODIUM CHLORIDE, SODIUM LACTATE, POTASSIUM CHLORIDE, CALCIUM CHLORIDE: 600; 310; 30; 20 INJECTION, SOLUTION INTRAVENOUS at 10:28

## 2022-02-07 RX ADMIN — Medication 100 MCG: at 11:05

## 2022-02-07 RX ADMIN — ONDANSETRON 4 MG: 2 INJECTION INTRAMUSCULAR; INTRAVENOUS at 10:38

## 2022-02-07 RX ADMIN — CEFAZOLIN SODIUM 2 G: 2 SOLUTION INTRAVENOUS at 10:32

## 2022-02-07 RX ADMIN — PROPOFOL 150 MCG/KG/MIN: 10 INJECTION, EMULSION INTRAVENOUS at 10:37

## 2022-02-07 RX ADMIN — PROPOFOL 50 MG: 10 INJECTION, EMULSION INTRAVENOUS at 10:37

## 2022-02-07 RX ADMIN — ACETAMINOPHEN 975 MG: 325 TABLET ORAL at 09:32

## 2022-02-07 RX ADMIN — PROPOFOL 40 MG: 10 INJECTION, EMULSION INTRAVENOUS at 10:43

## 2022-02-07 ASSESSMENT — MIFFLIN-ST. JEOR: SCORE: 1403.86

## 2022-02-07 NOTE — OP NOTE
OPERATIVE REPORT    February 7, 2022      PREOPERATIVE DIAGNOSIS:  Urethral diverticulum, recurrent UTI, mixed urinary incontinence    POSTOPERATIVE DIAGNOSIS: Same    PROCEDURES PERFORMED:   1. Cystourethroscopy  2. Targeted bladder biopsies  3. Fulguration of biopsied areas    STAFF SURGEON: Dr. Renetta Briones MD    RESIDENT(S):  Vinh Brito MD    ANESTHESIA: Choice    ESTIMATED BLOOD LOSS: < 10 mL.     DRAINS: None     SIGNIFICANT FINDINGS:  1. Diffuse cystitis changes along bladder. Hypervascular lesions <5mm along bladder dome. Ruffled tissue along trigone.   2. Urethral diverticulum os visualized at 5 o clock mid urethra.     OPERATIVE INDICATIONS:   Marielena Ramirez is a(n) 56 year old female with a history of urethral diverticulum, recurrent UTI. The patient elected for cystoscopy with bladder biopsy and evaluation of urethral diverticulum, after a discussion of all risks, benefits, and alternatives.    DESCRIPTION OF PROCEDURE:   After verification of informed consent was obtained, the patient was brought to the operating room, and moved to the operating table. After adequate anesthesia was induced, the patient was repositioned in dorsal lithotomy position and prepped and draped in the usual sterile fashion. A formal timeout was performed to confirm the correct patient, procedure and operative site.     A 22-Korean rigid cystoscope was inserted into a well lubricated urethra. We began by performing a white light cystourethroscopy. Bilateral ureteral orifices were in their orthotopic positions bilaterally. There were no intravesical stones, there was a right sided bladder wall diverticulum, the bladder was mildly trabeculated. There was ruffled tissue along the bladder trigone and a hypervascular lesion along the bladder dome <5mm.    We used a cold-cup flexible biopsy forceps to biopsy twice the ruffled trigonal tissue and the bladder dome hypervascular lesion. A Bugbee was used to fulgurate the biopsied  sites and any bleeding areas. The bladder was re-examined under low pressure and hemostasis was confirmed. We then proceeded to evaluate the urethra. The likely urethral diverticular os was visualized at 5 o clock in the mid urethra. A cone tip catheter was used to cannulate the os and distend the urethral diverticulum with normal saline. The urethral diverticulum was palpated exteriorly with confirmation of distension of the diverticulum, confirming this as the site of the os. The bladder was then drained.     The procedure was then concluded. The patient was transferred to the postanesthesia care unit in stable condition and tolerated the procedure well.    POSTOP PLAN:  1. F/u in 2-3 weeks as planned with Dr. Briones for pathology review      Vinh Brito  PGY-2  [phone]    Addendum:    I, Renetta Briones, was present and scrubbed for the entire case.  I agree with the note as above with changes made as needed. I spoke to the patient and her  in phase 2 with the assistance of an .  Will have her follow up to discuss pathology results and surgical planning for diverticulectomy    Renetta Briones MD MPH  (she/her/hers)   of Urology  [facility]

## 2022-02-07 NOTE — ANESTHESIA POSTPROCEDURE EVALUATION
Patient: Marielena Ramirez    Procedure: Procedure(s):  EXAM UNDER ANESTHESIA, CYSTOSCOPY, WITH BLADDER BIOPSY       Diagnosis:Mixed incontinence [N39.46]  Urethral diverticulum [N36.1]  Myalgia of pelvic floor [M79.18]  Pelvic floor dysfunction in female [M62.89]  Lesion of bladder [N32.9]  Diagnosis Additional Information: No value filed.    Anesthesia Type:  MAC    Note:  Disposition: Outpatient   Postop Pain Control: Uneventful            Sign Out: Well controlled pain   PONV: No   Neuro/Psych: Uneventful            Sign Out: Acceptable/Baseline neuro status   Airway/Respiratory: Uneventful            Sign Out: Acceptable/Baseline resp. status   CV/Hemodynamics: Uneventful            Sign Out: Acceptable CV status; No obvious hypovolemia; No obvious fluid overload   Other NRE: NONE   DID A NON-ROUTINE EVENT OCCUR? No           Last vitals:  Vitals Value Taken Time   BP 93/53 02/07/22 1124   Temp 36.3  C (97.4  F) 02/07/22 1124   Pulse     Resp 16 02/07/22 1124   SpO2 100 % 02/07/22 1124       Electronically Signed By: Gustavo Braga MD  February 7, 2022  11:42 AM

## 2022-02-07 NOTE — OR NURSING
[facility] OR [place]  [address]  [address] [facility] [address] [phone]    2022    Marielena Ramirez  [address] [place] [address]  [phone] (home)     :  1965    To Whom it May Concern:    Marielena Ramirez missed work on 2022 due to a procedure. Per her discharge instructions, Dr Briones, does not want her lifting anything greater then 15 lbs and not performing any strenuous activity until 2022.    Please contact me for any questions or concerns.    Sincerely,      Mandy FRIAS RN for Dr. Briones [phone]

## 2022-02-07 NOTE — PROGRESS NOTES
February 7, 2022    Patient seen in the preoperative area with the assistance of the .  She denies any changes in her health since last visit.  Allergies confirmed.  Procedure and its risks again discussed.  At this time questions answered and consents signed.  Plan to proceed as scheduled.  Discussed that today would be to do some biopsies and get a better exam for future surgical planning.    Renetta Briones MD MPH  (she/her/hers)   of Urology  [facility]

## 2022-02-07 NOTE — DISCHARGE INSTRUCTIONS
[facility]  Home Care Following Anesthesia  For 24 hours after surgery:  1. Get plenty of rest.  A responsible adult must stay with you for at least 24 hours after you leave the surgery center.  2. Do not drive or use heavy equipment.  If you have weakness or tingling, don't drive or use heavy equipment until this feeling goes away.   3. Do not drink alcohol.   4. Avoid strenuous or risky activities.  Ask for help when climbing stairs.  5. You may feel lightheaded.  IF so, sit for a few minutes before standing.  Have someone help you get up.   6. If you have nausea (feel sick to your stomach): Drink only clear liquids such as apple juice, ginger ale, broth or 7-Up.  Rest may also help.  Be sure to drink enough fluids.  Move to a regular diet as you feel able.   7. You may have a slight fever.  Call the doctor if your fever is over 100 F (37.7 C) (taken under the tongue) or lasts longer than 24 hours.  8. You may have a dry mouth, a sore throat, muscle aches or trouble sleeping. These should go away after 24 hours.  9. Do not make important or legal decisions.   10. It is recommended to avoid smoking.               Tips for taking pain medications  To get the best pain relief possible, remember these points:    Take pain medications as directed, before pain becomes severe.    Pain medication can upset your stomach: taking it with food may help.    Constipation is a common side effect of pain medication. Drink plenty of  fluids.    Eat foods high in fiber. Take a stool softener if recommended by your doctor or pharmacist.    Do not drink alcohol, drive or operate machinery while taking pain medications.    Ask about other ways to control pain, such as with heat, ice or relaxation.    Tylenol/Acetaminophen Consumption  To help encourage the safe use of acetaminophen, the makers of TYLENOL  have lowered the maximum daily dose for single-ingredient Extra Strength TYLENOL   (acetaminophen) products sold in the [place] from 8 pills per day (4,000 mg) to 6 pills per day (3,000 mg). The dosing interval has also changed from 2 pills every 4-6 hours to 2 pills every 6 hours.    If you feel your pain relief is insufficient, you may take Tylenol/Acetaminophen in addition to your narcotic pain medication.     Be careful not to exceed 3,000 mg of Tylenol/Acetaminophen in a 24 hour period from all sources.    If you are taking extra strength Tylenol/acetaminophen (500 mg), the maximum dose is 6 tablets in 24 hours.    If you are taking regular strength acetaminophen (325 mg), the maximum dose is 9 tablets in 24 hours.    Call a doctor for any of the followin. Signs of infection (fever, growing tenderness at the surgery site, a large amount of drainage or bleeding, severe pain, foul-smelling drainage, redness, swelling).  2. It has been over 8 to 10 hours since surgery and you are still not able to urinate (pass water).  3. Headache for over 24 hours.  4. Numbness, tingling or weakness the day after surgery (if you had spinal anesthesia).  5. Signs of Covid-19 infection (temperature over 100 degrees, shortness of breath, cough, loss of taste/smell, generalized body aches, persistent headache, chills, sore throat, nausea/vomiting/diarrhea)  Your doctor is:  Dr. Renetta Briones, Prostate and Urology: [phone]                    Or dial [phone] and ask for the resident on call for:  Prostate Urology  For emergency care, call the:  [place] [facility]:  [phone] (TTY for hearing impaired: [phone])

## 2022-02-07 NOTE — ANESTHESIA CARE TRANSFER NOTE
Patient: Marielena Ramirez    Procedure: Procedure(s):  EXAM UNDER ANESTHESIA, CYSTOSCOPY, WITH BLADDER BIOPSY       Diagnosis: Mixed incontinence [N39.46]  Urethral diverticulum [N36.1]  Myalgia of pelvic floor [M79.18]  Pelvic floor dysfunction in female [M62.89]  Lesion of bladder [N32.9]  Diagnosis Additional Information: No value filed.    Anesthesia Type:   MAC     Note:    Oropharynx: oropharynx clear of all foreign objects  Level of Consciousness: drowsy  Oxygen Supplementation: room air    Independent Airway: airway patency satisfactory and stable  Dentition: dentition unchanged  Vital Signs Stable: post-procedure vital signs reviewed and stable  Report to RN Given: handoff report given  Patient transferred to: Phase II  Comments: Vital signs per nursing documentation.     Handoff Report: Identifed the Patient, Identified the Reponsible Provider, Reviewed the pertinent medical history, Discussed the surgical course, Reviewed Intra-OP anesthesia mangement and issues during anesthesia, Set expectations for post-procedure period and Allowed opportunity for questions and acknowledgement of understanding      Vitals:  Vitals Value Taken Time   BP     Temp     Pulse     Resp     SpO2         Electronically Signed By: EDA Arce CRNA  February 7, 2022  11:20 AM

## 2022-02-08 LAB
PATH REPORT.COMMENTS IMP SPEC: NORMAL
PATH REPORT.COMMENTS IMP SPEC: NORMAL
PATH REPORT.FINAL DX SPEC: NORMAL
PATH REPORT.GROSS SPEC: NORMAL
PATH REPORT.MICROSCOPIC SPEC OTHER STN: NORMAL
PHOTO IMAGE: NORMAL

## 2022-02-08 PROCEDURE — 88305 TISSUE EXAM BY PATHOLOGIST: CPT | Mod: 26 | Performed by: PATHOLOGY

## 2022-02-16 ENCOUNTER — PRE VISIT (OUTPATIENT)
Dept: UROLOGY | Facility: CLINIC | Age: 57
End: 2022-02-16
Payer: COMMERCIAL

## 2022-02-16 NOTE — TELEPHONE ENCOUNTER
Reason for visit: Post op      Relevant information: bladder biopsy    Records/imaging/labs/orders: all records available    Pt called: no need for a call    At Rooming: standard

## 2022-02-22 ENCOUNTER — APPOINTMENT (OUTPATIENT)
Dept: INTERPRETER SERVICES | Facility: CLINIC | Age: 57
End: 2022-02-22
Payer: COMMERCIAL

## 2022-02-24 ENCOUNTER — OFFICE VISIT (OUTPATIENT)
Dept: UROLOGY | Facility: CLINIC | Age: 57
End: 2022-02-24
Payer: COMMERCIAL

## 2022-02-24 VITALS
DIASTOLIC BLOOD PRESSURE: 66 MMHG | HEART RATE: 69 BPM | SYSTOLIC BLOOD PRESSURE: 108 MMHG | HEIGHT: 64 IN | WEIGHT: 165 LBS | BODY MASS INDEX: 28.17 KG/M2

## 2022-02-24 DIAGNOSIS — M62.89 PELVIC FLOOR DYSFUNCTION IN FEMALE: ICD-10-CM

## 2022-02-24 DIAGNOSIS — N36.1 URETHRAL DIVERTICULUM: Primary | ICD-10-CM

## 2022-02-24 DIAGNOSIS — M79.18 MYALGIA OF PELVIC FLOOR: ICD-10-CM

## 2022-02-24 DIAGNOSIS — Z11.59 ENCOUNTER FOR SCREENING FOR OTHER VIRAL DISEASES: Primary | ICD-10-CM

## 2022-02-24 DIAGNOSIS — N39.46 MIXED INCONTINENCE: ICD-10-CM

## 2022-02-24 PROCEDURE — 99214 OFFICE O/P EST MOD 30 MIN: CPT | Performed by: UROLOGY

## 2022-02-24 RX ORDER — CEFAZOLIN SODIUM 2 G/50ML
2 SOLUTION INTRAVENOUS
Status: CANCELLED | OUTPATIENT
Start: 2022-02-24

## 2022-02-24 RX ORDER — CEFAZOLIN SODIUM 2 G/50ML
2 SOLUTION INTRAVENOUS SEE ADMIN INSTRUCTIONS
Status: CANCELLED | OUTPATIENT
Start: 2022-02-24

## 2022-02-24 RX ORDER — PHENAZOPYRIDINE HYDROCHLORIDE 100 MG/1
100 TABLET, FILM COATED ORAL 3 TIMES DAILY PRN
Qty: 30 TABLET | Refills: 1 | Status: SHIPPED | OUTPATIENT
Start: 2022-02-24 | End: 2022-03-26

## 2022-02-24 RX ORDER — CELECOXIB 100 MG/1
100 CAPSULE ORAL
COMMUNITY
Start: 2022-02-11

## 2022-02-24 ASSESSMENT — PAIN SCALES - GENERAL: PAINLEVEL: EXTREME PAIN (8)

## 2022-02-24 NOTE — LETTER
Date:March 11, 2022      Provider requested that no letter be sent. Do not send.       [facility]

## 2022-02-24 NOTE — PATIENT INSTRUCTIONS
Surgery schedule [phone]    Sheri SUTTON Care Coordinator [phone]    It was a pleasure meeting with you today.  Thank you for allowing me and my team the privilege of caring for you today.  YOU are the reason we are here, and I truly hope we provided you with the excellent service you deserve.  Please let us know if there is anything else we can do for you so that we can be sure you are leaving completely satisfied with your care experience.

## 2022-02-24 NOTE — PROGRESS NOTES
Assessment & Plan     Urethral diverticulum  Discussed that we can discuss repair of the diverticulum.  Discussed that the surgery will be more challenging given the fact that the diverticulum does not always distend well and that it is in a c shape configuration.  Discussed we would try to remove as much of the sac as safely possible and that in repairing the os of the diverticula that she would need a urethral cade for at least 2 weeks depending on the repair.  We further discussed that given her history of UTIs that we would have to have a urine culture prior and that we would want to make sure that her urine is as sterile as possible to prevent breakdown of the repair.      We discussed that the risks to the procedure include but not limited to cardiovascular risks of anesthesia, nerve injury, bleeding, infection, de vicenta or worsening stress incontinence or urge incontinence, need for post-operative cade catheter, need for further procedures including for recurrent diverticulum, urethral stricture, urethrovaginal fistula.  Patient expressed understanding and agreeable to proceed.      - Case Request: CYSTOSCOPY, WITH OPEN EXCISION OF URETHRAL DIVERTICULUM; Standing  - Case Request: CYSTOSCOPY, WITH OPEN EXCISION OF URETHRAL DIVERTICULUM  - phenazopyridine (PYRIDIUM) 100 MG tablet; Take 1 tablet (100 mg) by mouth 3 times daily as needed for urinary tract discomfort Try to not use more than 3 days at a time (Patient not taking: Reported on 3/3/2022)    Mixed incontinence  Discussed that at this time we would consider repair of the diverticulum and stage any further incontinence procedures at about 6 months after diverticulectomy if needed    Myalgia of pelvic floor/Pelvic floor dysfunction in female  Discussed that she will likely need to consider some pelvic floor therapy after she recovers from the diverticulectomy surgery    30 minutes were spent on the day of the encounter in reviewing the EMR including  "the biopsy results, direct patient care including surgical counseling, coordination of care and documentation    Renetta Briones MD MPH  (she/her/hers)   of Urology  [facility]      Amanda Peguero is a 56 year old who presents for the following health issues  accompanied by her spouse and .    HPI     Patient is here today with her  to discuss the recent surgery and plan for next steps.  Patient and her  are primarily French speaking and the entire visit today is conducted with the assistance of an .    Patient denies any changes in her health since last visit    Objective    /66   Pulse 69   Ht 1.626 m (5' 4\")   Wt 74.8 kg (165 lb)   BMI 28.32 kg/m    Body mass index is 28.32 kg/m .  Physical Exam   GENERAL: healthy, alert and no distress  EYES: Eyes grossly normal to inspection, conjunctivae and sclerae normal  HENT: normal cephalic/atraumatic.  External ears, nose and mouth without ulcers or lesions.  RESP: no audible wheeze, cough, or visible cyanosis.  No visible retractions or increased work of breathing.  Able to speak fully in complete sentences.  NEURO: Cranial nerves grossly intact, mentation intact and speech normal  PSYCH: mentation appears normal, affect normal/bright, judgement and insight intact, normal speech and appearance well-groomed    Pathology'   A. Urinary bladder, trigone, biopsy:  - Benign urothelium with chronic inflammation  - Negative for malignancy    B. Urinary bladder, dome, biopsy:  - Benign urothelium with chronic inflammation  - Negative for malignancy    CC  Patient Care Team:  No Ref-Primary, Physician as PCP - General  Myhre, Alicia, DO as Resident (Student in organized health care education/training program)  Renetta Briones MD as MD (Urology)  Sheri Ospina, RN as Specialty Care Coordinator (Urology)      "

## 2022-02-24 NOTE — LETTER
2/24/2022       RE: Marielena Ramirez  [address] [facility] [address]     Dear Colleague,    Thank you for referring your patient, Marielena Ramirez, to the [facility] [place] at [facility]. Please see a copy of my visit note below.      Assessment & Plan     Urethral diverticulum  Discussed that we can discuss repair of the diverticulum.  Discussed that the surgery will be more challenging given the fact that the diverticulum does not always distend well and that it is in a c shape configuration.  Discussed we would try to remove as much of the sac as safely possible and that in repairing the os of the diverticula that she would need a urethral cade for at least 2 weeks depending on the repair.  We further discussed that given her history of UTIs that we would have to have a urine culture prior and that we would want to make sure that her urine is as sterile as possible to prevent breakdown of the repair.      We discussed that the risks to the procedure include but not limited to cardiovascular risks of anesthesia, nerve injury, bleeding, infection, de vicenta or worsening stress incontinence or urge incontinence, need for post-operative cade catheter, need for further procedures including for recurrent diverticulum, urethral stricture, urethrovaginal fistula.  Patient expressed understanding and agreeable to proceed.      - Case Request: CYSTOSCOPY, WITH OPEN EXCISION OF URETHRAL DIVERTICULUM; Standing  - Case Request: CYSTOSCOPY, WITH OPEN EXCISION OF URETHRAL DIVERTICULUM  - phenazopyridine (PYRIDIUM) 100 MG tablet; Take 1 tablet (100 mg) by mouth 3 times daily as needed for urinary tract discomfort Try to not use more than 3 days at a time (Patient not taking: Reported on 3/3/2022)    Mixed incontinence  Discussed that at this time we would consider repair of the diverticulum and stage any further incontinence procedures at  "about 6 months after diverticulectomy if needed    Myalgia of pelvic floor/Pelvic floor dysfunction in female  Discussed that she will likely need to consider some pelvic floor therapy after she recovers from the diverticulectomy surgery    30 minutes were spent on the day of the encounter in reviewing the EMR including the biopsy results, direct patient care including surgical counseling, coordination of care and documentation    Renetta Briones MD MPH  (she/her/hers)   of Urology  [facility]      Amanda Peguero is a 56 year old who presents for the following health issues  accompanied by her spouse and .    HPI     Patient is here today with her  to discuss the recent surgery and plan for next steps.  Patient and her  are primarily Qatari speaking and the entire visit today is conducted with the assistance of an .    Patient denies any changes in her health since last visit    Objective    /66   Pulse 69   Ht 1.626 m (5' 4\")   Wt 74.8 kg (165 lb)   BMI 28.32 kg/m    Body mass index is 28.32 kg/m .  Physical Exam   GENERAL: healthy, alert and no distress  EYES: Eyes grossly normal to inspection, conjunctivae and sclerae normal  HENT: normal cephalic/atraumatic.  External ears, nose and mouth without ulcers or lesions.  RESP: no audible wheeze, cough, or visible cyanosis.  No visible retractions or increased work of breathing.  Able to speak fully in complete sentences.  NEURO: Cranial nerves grossly intact, mentation intact and speech normal  PSYCH: mentation appears normal, affect normal/bright, judgement and insight intact, normal speech and appearance well-groomed    Pathology'   A. Urinary bladder, trigone, biopsy:  - Benign urothelium with chronic inflammation  - Negative for malignancy    B. Urinary bladder, dome, biopsy:  - Benign urothelium with chronic inflammation  - Negative for malignancy    CC  Patient Care Team:  No " Ref-Primary, Physician as PCP - General  Myhre, Alicia, DO as Resident (Student in organized health care education/training program)  Renetta Briones MD as MD (Urology)  Sheri Ospina RN as Specialty Care Coordinator (Urology)          Again, thank you for allowing me to participate in the care of your patient.      Sincerely,    Renetta Briones MD

## 2022-02-24 NOTE — NURSING NOTE
"Chief Complaint   Patient presents with     Surgical Followup       Blood pressure 108/66, pulse 69, height 1.626 m (5' 4\"), weight 74.8 kg (165 lb), not currently breastfeeding. Body mass index is 28.32 kg/m .    Patient Active Problem List   Diagnosis     Mixed incontinence     Urethral diverticulum     Myalgia of pelvic floor     Pelvic floor dysfunction in female     Lesion of bladder       Allergies   Allergen Reactions     Fish-Derived Products Itching       Current Outpatient Medications   Medication Sig Dispense Refill     celecoxib (CELEBREX) 100 MG capsule Take 100 mg by mouth       acetaminophen (TYLENOL) 325 MG tablet Take 325-650 mg by mouth every 6 hours as needed for mild pain (Patient not taking: Reported on 2/24/2022)       esomeprazole (NEXIUM) 20 MG DR capsule Take 20 mg by mouth every morning (before breakfast) Take 30-60 minutes before eating. (Patient not taking: Reported on 2/24/2022)       ibuprofen (ADVIL/MOTRIN) 600 MG tablet Take 600 mg by mouth  (Patient not taking: Reported on 2/24/2022)         Social History     Tobacco Use     Smoking status: Never Smoker     Smokeless tobacco: Never Used   Substance Use Topics     Alcohol use: No     Drug use: No       Aruna Kaur  2/24/2022  11:38 AM  "

## 2022-02-24 NOTE — TELEPHONE ENCOUNTER
FUTURE VISIT INFORMATION      SURGERY INFORMATION:    Date: 3/7/22    [facility]    Surgeon:  Renetta Briones MD    Anesthesia Type:  general    Procedure: CYSTOSCOPY, WITH OPEN EXCISION OF URETHRAL DIVERTICULUM    Consult: ov     RECORDS REQUESTED FROM:       Primary Care Provider: Solitario Rivera MD - [facility]    Most recent EKG+ Tracin21- [facility]

## 2022-03-01 ENCOUNTER — APPOINTMENT (OUTPATIENT)
Dept: SURGERY | Facility: CLINIC | Age: 57
End: 2022-03-01
Payer: COMMERCIAL

## 2022-03-01 ENCOUNTER — PRE VISIT (OUTPATIENT)
Dept: SURGERY | Facility: CLINIC | Age: 57
End: 2022-03-01

## 2022-03-01 NOTE — TELEPHONE ENCOUNTER
FUTURE VISIT INFORMATION        SURGERY INFORMATION:    Date: 3/7/22    [facility]    Surgeon:  Renetta Briones MD    Anesthesia Type:  general    Procedure: CYSTOSCOPY, WITH OPEN EXCISION OF URETHRAL DIVERTICULUM    Consult: ov      RECORDS REQUESTED FROM:         Primary Care Provider: Solitario Rivera MD - [facility]     Most recent EKG+ Tracin21- [facility]

## 2022-03-02 DIAGNOSIS — N39.0 URINARY TRACT INFECTION: Primary | ICD-10-CM

## 2022-03-03 ENCOUNTER — LAB (OUTPATIENT)
Dept: LAB | Facility: CLINIC | Age: 57
End: 2022-03-03
Payer: COMMERCIAL

## 2022-03-03 ENCOUNTER — PRE VISIT (OUTPATIENT)
Dept: SURGERY | Facility: CLINIC | Age: 57
End: 2022-03-03
Payer: COMMERCIAL

## 2022-03-03 ENCOUNTER — VIRTUAL VISIT (OUTPATIENT)
Dept: SURGERY | Facility: CLINIC | Age: 57
End: 2022-03-03
Payer: COMMERCIAL

## 2022-03-03 ENCOUNTER — LAB (OUTPATIENT)
Dept: LAB | Facility: CLINIC | Age: 57
End: 2022-03-03
Attending: UROLOGY

## 2022-03-03 ENCOUNTER — ANESTHESIA EVENT (OUTPATIENT)
Dept: SURGERY | Facility: AMBULATORY SURGERY CENTER | Age: 57
End: 2022-03-03
Payer: COMMERCIAL

## 2022-03-03 VITALS
SYSTOLIC BLOOD PRESSURE: 109 MMHG | OXYGEN SATURATION: 93 % | HEIGHT: 64 IN | BODY MASS INDEX: 28.32 KG/M2 | DIASTOLIC BLOOD PRESSURE: 64 MMHG | TEMPERATURE: 97.7 F | HEART RATE: 74 BPM

## 2022-03-03 DIAGNOSIS — N36.1 URETHRAL DIVERTICULUM: ICD-10-CM

## 2022-03-03 DIAGNOSIS — N39.0 URINARY TRACT INFECTION: ICD-10-CM

## 2022-03-03 DIAGNOSIS — Z11.59 ENCOUNTER FOR SCREENING FOR OTHER VIRAL DISEASES: ICD-10-CM

## 2022-03-03 DIAGNOSIS — Z01.818 PREOP EXAMINATION: Primary | ICD-10-CM

## 2022-03-03 LAB
ALBUMIN UR-MCNC: NEGATIVE MG/DL
APPEARANCE UR: CLEAR
BILIRUB UR QL STRIP: NEGATIVE
COLOR UR AUTO: YELLOW
GLUCOSE UR STRIP-MCNC: NEGATIVE MG/DL
HGB UR QL STRIP: NEGATIVE
KETONES UR STRIP-MCNC: NEGATIVE MG/DL
LEUKOCYTE ESTERASE UR QL STRIP: ABNORMAL
MUCOUS THREADS #/AREA URNS LPF: PRESENT /LPF
NITRATE UR QL: NEGATIVE
PH UR STRIP: 6 [PH] (ref 5–7)
RBC URINE: 3 /HPF
SARS-COV-2 RNA RESP QL NAA+PROBE: NEGATIVE
SP GR UR STRIP: 1.02 (ref 1–1.03)
SQUAMOUS EPITHELIAL: 1 /HPF
UROBILINOGEN UR STRIP-MCNC: NORMAL MG/DL
WBC URINE: 22 /HPF

## 2022-03-03 PROCEDURE — 99203 OFFICE O/P NEW LOW 30 MIN: CPT | Mod: 95 | Performed by: PHYSICIAN ASSISTANT

## 2022-03-03 PROCEDURE — 87086 URINE CULTURE/COLONY COUNT: CPT | Performed by: UROLOGY

## 2022-03-03 PROCEDURE — U0005 INFEC AGEN DETEC AMPLI PROBE: HCPCS | Performed by: UROLOGY

## 2022-03-03 PROCEDURE — 81001 URINALYSIS AUTO W/SCOPE: CPT | Performed by: PATHOLOGY

## 2022-03-03 ASSESSMENT — PAIN SCALES - GENERAL: PAINLEVEL: EXTREME PAIN (8)

## 2022-03-03 ASSESSMENT — LIFESTYLE VARIABLES: TOBACCO_USE: 0

## 2022-03-03 NOTE — PROGRESS NOTES
Marielena is a 56 year old who is being evaluated via a billable video visit.      How would you like to obtain your AVS? Mail a copy  If the video visit is dropped, the invitation should be resent by: Text to cell phone: [phone]    HPI         Review of Systems         Objective    Vitals - Patient Reported  Pain Score: Extreme Pain (8)  Pain Loc: Other - see comment (Site of hernia surgery in abdomen)        Physical Exam

## 2022-03-03 NOTE — PATIENT INSTRUCTIONS
Preparing for Your Surgery      Name:  Marielena Ramirez   MRN:  [id number]   :  1965   Today's Date:  3/3/2022         Arriving for surgery:  Surgery date:  3/7/22  Arrival time:  8:55AM    Restrictions due to COVID 19:    Effective 2022  1 visitor may accompany patient and wait in the surgery waiting room  All visitors must wear a mask and social distance          parking is available for anyone with mobility limitations or disabilities. (Monday- Friday 7 am- 5 pm)    Please come to:    [facility]  [address] [place] [id number]    Please check in on the 5th floor at the Ambulatory Surgery Center       What can I eat or drink?    -  You may eat and drink normally until 8 hours before surgery. (Until 3/7/2, 2:25AM)  -  You may have clear liquids up to 4 hours before surgery. (Until 3/7/22, 6:25AM)  Examples of clear liquids:  Water  Clear broth  Juices (apple, white grape, white cranberry  and cider) without pulp  Noncarbonated, powder based beverages  (lemonade and Peewee-Aid)  Sodas (Sprite, 7-Up, ginger ale and seltzer)  Coffee or tea (without milk or cream)  Gatorade    --No alcohol for at least 24 hours before surgery    Which medicines can I take?    Hold Aspirin for 7 days before surgery.   Hold Multivitamins for 7 days before surgery.  Hold Supplements for 7 days before surgery.  Hold Ibuprofen (Advil, Motrin) for 1 day before surgery--unless otherwise directed by surgeon.  Hold Naproxen (Aleve) for 4 days before surgery.      -  PLEASE TAKE the following medications the day of surgery     Esomeprazole(Nexium)    How do I prepare myself?  - Please take 2 showers before surgery using Scrubcare or Hibiclens soap.    Use this soap only from the neck to your toes.     Leave the soap on your skin for one minute--then rinse thoroughly.      You may use your own shampoo and conditioner; no other hair products.   - Please remove all jewelry and body  piercings.  - No lotions, deodorants or fragrance.  - No makeup or fingernail polish.   - Bring your ID and insurance card.    -If you have a Deep Brain Stimulator, a Spinal Cord Stimulator or any implanted Neuro Device you must bring the remote to the Surgery Center         - All patients are required to have a Covid-19 test within 4 days of surgery/procedure.      -Patients will be contacted by the [facility] scheduling team within 1 week of surgery to make an appointment.      - Patients may call the Scheduling team at [phone] if they have not been scheduled within 4 days of  surgery.      ALL PATIENTS ARE REQUIRED TO HAVE A RESPONSIBLE ADULT TO DRIVE AND BE IN ATTENDANCE WITH THEM FOR 24 HOURS FOLLOWING SURGERY       Questions or Concerns:    -For questions regarding the day of surgery please contact the Ambulatory Surgery Center at [phone].    -If you have health changes between today and your surgery please contact your surgeon.     For questions after surgery please call your surgeons office.

## 2022-03-03 NOTE — H&P
Pre-Operative H & P     CC:  Preoperative exam to assess for increased cardiopulmonary risk while undergoing surgery and anesthesia.    Date of Encounter: 3/3/2022  Primary Care Physician:  No Ref-Primary, Physician     Reason for visit:   Encounter Diagnoses   Name Primary?     Preop examination Yes     Urethral diverticulum        HPI  Marielena Ramirez is a 56 year old female who presents for pre-operative H & P in preparation for  Procedure Information     Case: [id number] Date/Time: 03/07/22 1025    Procedure: CYSTOSCOPY, WITH OPEN EXCISION OF URETHRAL DIVERTICULUM (N/A Urethra)    Anesthesia type: General    Diagnosis: Urethral diverticulum [N36.1]    Pre-op diagnosis: Urethral diverticulum [N36.1]    Location: [place] [address] / [facility]    Providers: Renetta Briones MD        Pt has PMH significant for GERD and umbilical hernia repair.  She has history of recurrent UTIs, vaginal atrophy and pelvic floor symptoms.  She is s/p EUA with cystoscopy and bladder biopsies 2/7/22.  Pathology was negative for malignancy but showed chronic inflammation. She now presents for the above procedure.      History is obtained from the patient and chart review.  Visit was completed with aid of  on iPad. Pt was in the clinic and I was at home.    Hx of abnormal bleeding or anti-platelet use: denies    Menstrual history: No LMP recorded. Patient is postmenopausal.:      Past Medical History  Past Medical History:   Diagnosis Date     Gastroesophageal reflux disease without esophagitis      Lesion of bladder      Mixed incontinence      Myalgia of pelvic floor      Pelvic floor dysfunction in female      Recurrent UTI      Umbilical hernia without obstruction and without gangrene      Urethral diverticulum      Vaginal atrophy        Past Surgical History  Past Surgical History:   Procedure Laterality Date     CHOLECYSTECTOMY      10/2021     CYSTOSCOPY,  BIOPSY BLADDER, COMBINED N/A 2/7/2022    Procedure: EXAM UNDER ANESTHESIA, CYSTOSCOPY, WITH BLADDER BIOPSY;  Surgeon: Renetta Briones MD;  Location: [facility] OR     HERNIA REPAIR  10/25/2021    Umbilical hernia     TUBAL LIGATION  2008       Prior to Admission Medications  Current Outpatient Medications   Medication Sig Dispense Refill     esomeprazole (NEXIUM) 20 MG DR capsule Take 20 mg by mouth every morning (before breakfast) Take 30-60 minutes before eating.        acetaminophen (TYLENOL) 325 MG tablet Take 325-650 mg by mouth every 6 hours as needed for mild pain (Patient not taking: Reported on 2/24/2022)       celecoxib (CELEBREX) 100 MG capsule Take 100 mg by mouth (Patient not taking: Reported on 3/3/2022)       ibuprofen (ADVIL/MOTRIN) 600 MG tablet Take 600 mg by mouth  (Patient not taking: Reported on 2/24/2022)       phenazopyridine (PYRIDIUM) 100 MG tablet Take 1 tablet (100 mg) by mouth 3 times daily as needed for urinary tract discomfort Try to not use more than 3 days at a time (Patient not taking: Reported on 3/3/2022) 30 tablet 1       Allergies  Allergies   Allergen Reactions     Fish-Derived Products Itching       Social History  Social History     Socioeconomic History     Marital status: Legally      Spouse name: Not on file     Number of children: Not on file     Years of education: Not on file     Highest education level: Not on file   Occupational History     Not on file   Tobacco Use     Smoking status: Never Smoker     Smokeless tobacco: Never Used   Substance and Sexual Activity     Alcohol use: No     Drug use: No     Sexual activity: Not Currently   Other Topics Concern     Not on file   Social History Narrative     Not on file     Social Determinants of Health     Financial Resource Strain: Not on file   Food Insecurity: Not on file   Transportation Needs: Not on file   Physical Activity: Not on file   Stress: Not on file   Social Connections: Not on file   Intimate  Partner Violence: Not on file   Housing Stability: Not on file       Family History  Family History   Problem Relation Age of Onset     Cancer Mother      Heart Disease Sister         had heart surgery     Anesthesia Reaction No family hx of      Deep Vein Thrombosis (DVT) No family hx of        Review of Systems  The complete review of systems is negative other than noted in the HPI or here.       Anesthesia Evaluation   Pt has had prior anesthetic.     No history of anesthetic complications       ROS/MED HX  ENT/Pulmonary:    (-) tobacco use, asthma, sleep apnea and recent URI   Neurologic:  - neg neurologic ROS     Cardiovascular:  - neg cardiovascular ROS   (+) -----No previous cardiac testing     METS/Exercise Tolerance: >4 METS    Hematologic:  - neg hematologic  ROS  (-) history of blood clots and history of blood transfusion   Musculoskeletal:  - neg musculoskeletal ROS     GI/Hepatic: Comment: S/P cholecystectomy 10/2021  S/p umbilical hernia repair 10/2021     (+) GERD, Asymptomatic on medication,     Renal/Genitourinary: Comment: Recurrent UTIs  Bladder lesion  Urethral diverticulum  Pelvic floor dysfunction      Endo:  - neg endo ROS     Psychiatric/Substance Use:  - neg psychiatric ROS     Infectious Disease:  - neg infectious disease ROS     Malignancy:  - neg malignancy ROS     Other:  - neg other ROS          Virtual visit -  No vitals were obtained    Physical Exam  Constitutional: Awake, alert, cooperative, no apparent distress, and appears stated age.  Eyes: Pupils equal  HENT: Normocephalic  Respiratory: non labored breathing   Neurologic: Awake, alert, oriented to name, place and time.   Neuropsychiatric: Calm, cooperative. Normal affect.      Prior Labs/Diagnostic Studies   All labs and imaging personally reviewed     EKG/ stress test - if available please see in ROS above       The patient's records and results personally reviewed by this provider.     Outside records reviewed from: Care  "Everywhere      Assessment      Marielena Ramirez is a 56 year old female was seen as a PAC referral for risk assessment and optimization for anesthesia.    Plan/Recommendations  Pt will be optimized for the proposed procedure.  See below for details on the assessment, risk, and preoperative recommendations    NEUROLOGY  - No history of TIA, CVA or seizure  -Post Op delirium risk factors:  No risk identified    ENT  - unable to assess  Mallampati: Unable to assess  TM: Unable to assess    CARDIAC  - No history of CAD, Hypertension and Afib  - METS (Metabolic Equivalents)  Patient performs 4 or more METS exercise without symptoms  Total Score: 0      RCRI-Very low risk: Class 1 0.4% complication rate  Total Score: 0        PULMONARY    LUAN Low Risk  Total Score: 1           LUAN: Over 50 ys old      - Denies asthma or inhaler use  - Tobacco History      History   Smoking Status     Never Smoker   Smokeless Tobacco     Never Used       GI  - GERD  Controlled on medications: Proton Pump Inhibitor  PONV Medium Risk  Total Score: 2           1 AN PONV: Pt is Female    1 AN PONV: Patient is not a current smoker        /RENAL  - recurrent UTIs  - urethral diverticulum, procedure as above    ENDOCRINE    - BMI: Estimated body mass index is 28.32 kg/m  as calculated from the following:    Height as of this encounter: 1.626 m (5' 4\").    Weight as of 2/24/22: 74.8 kg (165 lb).  Overweight (BMI 25.0-29.9)  - No history of Diabetes Mellitus    HEME  VTE Low Risk 0.26%  Total Score: 0      - No history of abnormal bleeding or antiplatelet use.            The patient is optimized for their procedure. AVS with information on surgery time/arrival time, meds and NPO status given by nursing staff. No further diagnostic testing indicated.    Please refer to the physical examination documented by the anesthesiologist in the anesthesia record on the day of surgery.    Video-Visit Details    Type of service:  Video Visit    Patient " verbally consented to video service today: YES  Pt does not have MyChart and was unable to access video on her phone.  We proceeded by telephone.    Visit Length: 10 minutes    Originating Location (pt. Location): Other [facility]    Distant Location (provider location): home    Mode of Communication:  Telephone Conference via Doximity     On the day of service:     Prep time: 10 minutes  Visit time: 10 minutes  Documentation time: 10 minutes  ------------------------------------------  Total time: 30 minutes      Caryn Dick PA-C  Preoperative Assessment Center  [facility]  Cl[facility] Surgery Center  Phone: [phone]  Fax: [phone]

## 2022-03-04 DIAGNOSIS — Z11.59 ENCOUNTER FOR SCREENING FOR OTHER VIRAL DISEASES: Primary | ICD-10-CM

## 2022-03-05 LAB — BACTERIA UR CULT: ABNORMAL

## 2022-03-07 ENCOUNTER — PATIENT OUTREACH (OUTPATIENT)
Dept: UROLOGY | Facility: CLINIC | Age: 57
End: 2022-03-07
Payer: COMMERCIAL

## 2022-03-07 ENCOUNTER — APPOINTMENT (OUTPATIENT)
Dept: INTERPRETER SERVICES | Facility: CLINIC | Age: 57
End: 2022-03-07
Payer: COMMERCIAL

## 2022-03-07 ENCOUNTER — ANESTHESIA (OUTPATIENT)
Dept: SURGERY | Facility: AMBULATORY SURGERY CENTER | Age: 57
End: 2022-03-07
Payer: COMMERCIAL

## 2022-03-07 DIAGNOSIS — N39.0 URINARY TRACT INFECTION: Primary | ICD-10-CM

## 2022-03-07 RX ORDER — AMOXICILLIN 500 MG/1
500 CAPSULE ORAL 2 TIMES DAILY
Qty: 14 CAPSULE | Refills: 0 | Status: SHIPPED | OUTPATIENT
Start: 2022-03-07 | End: 2022-03-14

## 2022-03-07 NOTE — TELEPHONE ENCOUNTER
Notified patient she has an UTI and antibiotics were sent to [facility] on [facility]  Patient agreed to plan     Sheri Ospina RN, BSN  Care Coordinator Urology

## 2022-03-10 ENCOUNTER — LAB (OUTPATIENT)
Dept: LAB | Facility: CLINIC | Age: 57
End: 2022-03-10
Attending: UROLOGY

## 2022-03-10 DIAGNOSIS — Z11.59 ENCOUNTER FOR SCREENING FOR OTHER VIRAL DISEASES: ICD-10-CM

## 2022-03-10 LAB — SARS-COV-2 RNA RESP QL NAA+PROBE: NEGATIVE

## 2022-03-10 PROCEDURE — U0003 INFECTIOUS AGENT DETECTION BY NUCLEIC ACID (DNA OR RNA); SEVERE ACUTE RESPIRATORY SYNDROME CORONAVIRUS 2 (SARS-COV-2) (CORONAVIRUS DISEASE [COVID-19]), AMPLIFIED PROBE TECHNIQUE, MAKING USE OF HIGH THROUGHPUT TECHNOLOGIES AS DESCRIBED BY CMS-2020-01-R: HCPCS | Performed by: UROLOGY

## 2022-03-11 ENCOUNTER — APPOINTMENT (OUTPATIENT)
Dept: INTERPRETER SERVICES | Facility: CLINIC | Age: 57
End: 2022-03-11
Payer: COMMERCIAL

## 2022-03-14 ENCOUNTER — PATIENT OUTREACH (OUTPATIENT)
Dept: UROLOGY | Facility: CLINIC | Age: 57
End: 2022-03-14

## 2022-03-14 ENCOUNTER — HOSPITAL ENCOUNTER (OUTPATIENT)
Facility: AMBULATORY SURGERY CENTER | Age: 57
Discharge: HOME | End: 2022-03-14
Attending: UROLOGY
Payer: COMMERCIAL

## 2022-03-14 ENCOUNTER — HOSPITAL ENCOUNTER (EMERGENCY)
Facility: CLINIC | Age: 57
Discharge: HOME | End: 2022-03-15
Attending: EMERGENCY MEDICINE | Admitting: EMERGENCY MEDICINE
Payer: COMMERCIAL

## 2022-03-14 VITALS
SYSTOLIC BLOOD PRESSURE: 107 MMHG | HEART RATE: 97 BPM | OXYGEN SATURATION: 97 % | HEIGHT: 64 IN | RESPIRATION RATE: 16 BRPM | DIASTOLIC BLOOD PRESSURE: 69 MMHG | BODY MASS INDEX: 28.17 KG/M2 | TEMPERATURE: 97.2 F | WEIGHT: 165 LBS

## 2022-03-14 DIAGNOSIS — N32.89 BLADDER SPASMS: ICD-10-CM

## 2022-03-14 DIAGNOSIS — N36.1 URETHRAL DIVERTICULUM: Primary | ICD-10-CM

## 2022-03-14 DIAGNOSIS — G89.18 POSTOPERATIVE PAIN: ICD-10-CM

## 2022-03-14 PROCEDURE — 96361 HYDRATE IV INFUSION ADD-ON: CPT | Performed by: EMERGENCY MEDICINE

## 2022-03-14 PROCEDURE — 53230 REMOVAL OF URETHRA LESION: CPT | Mod: 22 | Performed by: UROLOGY

## 2022-03-14 PROCEDURE — 88305 TISSUE EXAM BY PATHOLOGIST: CPT | Mod: TC | Performed by: UROLOGY

## 2022-03-14 PROCEDURE — 99284 EMERGENCY DEPT VISIT MOD MDM: CPT | Performed by: EMERGENCY MEDICINE

## 2022-03-14 PROCEDURE — 51702 INSERT TEMP BLADDER CATH: CPT | Performed by: EMERGENCY MEDICINE

## 2022-03-14 PROCEDURE — 99284 EMERGENCY DEPT VISIT MOD MDM: CPT | Mod: 25 | Performed by: EMERGENCY MEDICINE

## 2022-03-14 PROCEDURE — 53230 REMOVAL OF URETHRA LESION: CPT

## 2022-03-14 PROCEDURE — 51798 US URINE CAPACITY MEASURE: CPT | Performed by: EMERGENCY MEDICINE

## 2022-03-14 PROCEDURE — 96360 HYDRATION IV INFUSION INIT: CPT | Performed by: EMERGENCY MEDICINE

## 2022-03-14 RX ORDER — SCOLOPAMINE TRANSDERMAL SYSTEM 1 MG/1
1 PATCH, EXTENDED RELEASE TRANSDERMAL
Status: DISCONTINUED | OUTPATIENT
Start: 2022-03-14 | End: 2022-03-15 | Stop reason: HOSPADM

## 2022-03-14 RX ORDER — FENTANYL CITRATE 50 UG/ML
INJECTION, SOLUTION INTRAMUSCULAR; INTRAVENOUS PRN
Status: DISCONTINUED | OUTPATIENT
Start: 2022-03-14 | End: 2022-03-14

## 2022-03-14 RX ORDER — GLYCOPYRROLATE 0.2 MG/ML
INJECTION, SOLUTION INTRAMUSCULAR; INTRAVENOUS PRN
Status: DISCONTINUED | OUTPATIENT
Start: 2022-03-14 | End: 2022-03-14

## 2022-03-14 RX ORDER — ONDANSETRON 4 MG/1
4 TABLET, ORALLY DISINTEGRATING ORAL EVERY 30 MIN PRN
Status: DISCONTINUED | OUTPATIENT
Start: 2022-03-14 | End: 2022-03-15 | Stop reason: HOSPADM

## 2022-03-14 RX ORDER — SENNA AND DOCUSATE SODIUM 50; 8.6 MG/1; MG/1
1 TABLET, FILM COATED ORAL AT BEDTIME
Qty: 15 TABLET | Refills: 0 | Status: SHIPPED | OUTPATIENT
Start: 2022-03-14 | End: 2022-03-29

## 2022-03-14 RX ORDER — CEFAZOLIN SODIUM 2 G/50ML
2 SOLUTION INTRAVENOUS
Status: COMPLETED | OUTPATIENT
Start: 2022-03-14 | End: 2022-03-14

## 2022-03-14 RX ORDER — ACETAMINOPHEN 325 MG/1
975 TABLET ORAL ONCE
Status: COMPLETED | OUTPATIENT
Start: 2022-03-14 | End: 2022-03-14

## 2022-03-14 RX ORDER — FENTANYL CITRATE 50 UG/ML
25 INJECTION, SOLUTION INTRAMUSCULAR; INTRAVENOUS EVERY 5 MIN PRN
Status: DISCONTINUED | OUTPATIENT
Start: 2022-03-14 | End: 2022-03-14 | Stop reason: HOSPADM

## 2022-03-14 RX ORDER — SODIUM CHLORIDE, SODIUM LACTATE, POTASSIUM CHLORIDE, CALCIUM CHLORIDE 600; 310; 30; 20 MG/100ML; MG/100ML; MG/100ML; MG/100ML
INJECTION, SOLUTION INTRAVENOUS CONTINUOUS
Status: DISCONTINUED | OUTPATIENT
Start: 2022-03-14 | End: 2022-03-15 | Stop reason: HOSPADM

## 2022-03-14 RX ORDER — DEXAMETHASONE SODIUM PHOSPHATE 4 MG/ML
INJECTION, SOLUTION INTRA-ARTICULAR; INTRALESIONAL; INTRAMUSCULAR; INTRAVENOUS; SOFT TISSUE PRN
Status: DISCONTINUED | OUTPATIENT
Start: 2022-03-14 | End: 2022-03-14

## 2022-03-14 RX ORDER — OXYCODONE HYDROCHLORIDE 5 MG/1
5 TABLET ORAL EVERY 4 HOURS PRN
Status: DISCONTINUED | OUTPATIENT
Start: 2022-03-14 | End: 2022-03-15 | Stop reason: HOSPADM

## 2022-03-14 RX ORDER — OXYBUTYNIN CHLORIDE 5 MG/1
2.5-5 TABLET ORAL 2 TIMES DAILY PRN
Qty: 30 TABLET | Refills: 0 | Status: SHIPPED | OUTPATIENT
Start: 2022-03-14 | End: 2022-03-15

## 2022-03-14 RX ORDER — SULFAMETHOXAZOLE AND TRIMETHOPRIM 400; 80 MG/1; MG/1
1 TABLET ORAL 2 TIMES DAILY
Status: DISCONTINUED | OUTPATIENT
Start: 2022-03-14 | End: 2022-03-15 | Stop reason: HOSPADM

## 2022-03-14 RX ORDER — LIDOCAINE 40 MG/G
CREAM TOPICAL
Status: DISCONTINUED | OUTPATIENT
Start: 2022-03-14 | End: 2022-03-14 | Stop reason: HOSPADM

## 2022-03-14 RX ORDER — HYDROCODONE BITARTRATE AND ACETAMINOPHEN 5; 325 MG/1; MG/1
1 TABLET ORAL EVERY 6 HOURS PRN
Qty: 18 TABLET | Refills: 0 | Status: SHIPPED | OUTPATIENT
Start: 2022-03-14 | End: 2022-03-19

## 2022-03-14 RX ORDER — FENTANYL CITRATE 50 UG/ML
25 INJECTION, SOLUTION INTRAMUSCULAR; INTRAVENOUS
Status: DISCONTINUED | OUTPATIENT
Start: 2022-03-14 | End: 2022-03-15 | Stop reason: HOSPADM

## 2022-03-14 RX ORDER — MEPERIDINE HYDROCHLORIDE 25 MG/ML
12.5 INJECTION INTRAMUSCULAR; INTRAVENOUS; SUBCUTANEOUS
Status: DISCONTINUED | OUTPATIENT
Start: 2022-03-14 | End: 2022-03-15 | Stop reason: HOSPADM

## 2022-03-14 RX ORDER — KETOROLAC TROMETHAMINE 30 MG/ML
INJECTION, SOLUTION INTRAMUSCULAR; INTRAVENOUS PRN
Status: DISCONTINUED | OUTPATIENT
Start: 2022-03-14 | End: 2022-03-14

## 2022-03-14 RX ORDER — SULFAMETHOXAZOLE/TRIMETHOPRIM 800-160 MG
1 TABLET ORAL DAILY
Qty: 28 TABLET | Refills: 0 | Status: SHIPPED | OUTPATIENT
Start: 2022-03-14 | End: 2023-06-15

## 2022-03-14 RX ORDER — ONDANSETRON 2 MG/ML
4 INJECTION INTRAMUSCULAR; INTRAVENOUS EVERY 30 MIN PRN
Status: DISCONTINUED | OUTPATIENT
Start: 2022-03-14 | End: 2022-03-15 | Stop reason: HOSPADM

## 2022-03-14 RX ORDER — PROPOFOL 10 MG/ML
INJECTION, EMULSION INTRAVENOUS CONTINUOUS PRN
Status: DISCONTINUED | OUTPATIENT
Start: 2022-03-14 | End: 2022-03-14

## 2022-03-14 RX ORDER — HYDROMORPHONE HYDROCHLORIDE 1 MG/ML
0.2 INJECTION, SOLUTION INTRAMUSCULAR; INTRAVENOUS; SUBCUTANEOUS EVERY 5 MIN PRN
Status: DISCONTINUED | OUTPATIENT
Start: 2022-03-14 | End: 2022-03-14 | Stop reason: HOSPADM

## 2022-03-14 RX ORDER — BUPIVACAINE HYDROCHLORIDE AND EPINEPHRINE 2.5; 5 MG/ML; UG/ML
INJECTION, SOLUTION EPIDURAL; INFILTRATION; INTRACAUDAL; PERINEURAL PRN
Status: DISCONTINUED | OUTPATIENT
Start: 2022-03-14 | End: 2022-03-14 | Stop reason: HOSPADM

## 2022-03-14 RX ORDER — EPHEDRINE SULFATE 50 MG/ML
INJECTION, SOLUTION INTRAMUSCULAR; INTRAVENOUS; SUBCUTANEOUS PRN
Status: DISCONTINUED | OUTPATIENT
Start: 2022-03-14 | End: 2022-03-14

## 2022-03-14 RX ORDER — CEFAZOLIN SODIUM 2 G/50ML
2 SOLUTION INTRAVENOUS SEE ADMIN INSTRUCTIONS
Status: DISCONTINUED | OUTPATIENT
Start: 2022-03-14 | End: 2022-03-14 | Stop reason: HOSPADM

## 2022-03-14 RX ORDER — ONDANSETRON 2 MG/ML
INJECTION INTRAMUSCULAR; INTRAVENOUS PRN
Status: DISCONTINUED | OUTPATIENT
Start: 2022-03-14 | End: 2022-03-14

## 2022-03-14 RX ORDER — IBUPROFEN 200 MG
600 TABLET ORAL ONCE
Status: DISCONTINUED | OUTPATIENT
Start: 2022-03-14 | End: 2022-03-15 | Stop reason: HOSPADM

## 2022-03-14 RX ORDER — SODIUM CHLORIDE, SODIUM LACTATE, POTASSIUM CHLORIDE, CALCIUM CHLORIDE 600; 310; 30; 20 MG/100ML; MG/100ML; MG/100ML; MG/100ML
INJECTION, SOLUTION INTRAVENOUS CONTINUOUS
Status: DISCONTINUED | OUTPATIENT
Start: 2022-03-14 | End: 2022-03-14 | Stop reason: HOSPADM

## 2022-03-14 RX ORDER — PROPOFOL 10 MG/ML
INJECTION, EMULSION INTRAVENOUS PRN
Status: DISCONTINUED | OUTPATIENT
Start: 2022-03-14 | End: 2022-03-14

## 2022-03-14 RX ORDER — LIDOCAINE HYDROCHLORIDE 20 MG/ML
INJECTION, SOLUTION INFILTRATION; PERINEURAL PRN
Status: DISCONTINUED | OUTPATIENT
Start: 2022-03-14 | End: 2022-03-14

## 2022-03-14 RX ORDER — SULFAMETHOXAZOLE/TRIMETHOPRIM 800-160 MG
1 TABLET ORAL 2 TIMES DAILY
Qty: 56 TABLET | Refills: 0 | Status: SHIPPED | OUTPATIENT
Start: 2022-03-14 | End: 2022-03-14

## 2022-03-14 RX ADMIN — PROPOFOL 100 MG: 10 INJECTION, EMULSION INTRAVENOUS at 14:21

## 2022-03-14 RX ADMIN — FENTANYL CITRATE 25 MCG: 50 INJECTION, SOLUTION INTRAMUSCULAR; INTRAVENOUS at 13:17

## 2022-03-14 RX ADMIN — GLYCOPYRROLATE 0.2 MG: 0.2 INJECTION, SOLUTION INTRAMUSCULAR; INTRAVENOUS at 10:24

## 2022-03-14 RX ADMIN — FENTANYL CITRATE 25 MCG: 50 INJECTION, SOLUTION INTRAMUSCULAR; INTRAVENOUS at 11:11

## 2022-03-14 RX ADMIN — CEFAZOLIN SODIUM 2 G: 2 SOLUTION INTRAVENOUS at 10:18

## 2022-03-14 RX ADMIN — FENTANYL CITRATE 25 MCG: 50 INJECTION, SOLUTION INTRAMUSCULAR; INTRAVENOUS at 12:56

## 2022-03-14 RX ADMIN — SCOLOPAMINE TRANSDERMAL SYSTEM 1 PATCH: 1 PATCH, EXTENDED RELEASE TRANSDERMAL at 09:45

## 2022-03-14 RX ADMIN — KETOROLAC TROMETHAMINE 15 MG: 30 INJECTION, SOLUTION INTRAMUSCULAR; INTRAVENOUS at 14:02

## 2022-03-14 RX ADMIN — EPHEDRINE SULFATE 5 MG: 50 INJECTION, SOLUTION INTRAMUSCULAR; INTRAVENOUS; SUBCUTANEOUS at 10:36

## 2022-03-14 RX ADMIN — DEXAMETHASONE SODIUM PHOSPHATE 4 MG: 4 INJECTION, SOLUTION INTRA-ARTICULAR; INTRALESIONAL; INTRAMUSCULAR; INTRAVENOUS; SOFT TISSUE at 10:11

## 2022-03-14 RX ADMIN — SODIUM CHLORIDE, SODIUM LACTATE, POTASSIUM CHLORIDE, CALCIUM CHLORIDE: 600; 310; 30; 20 INJECTION, SOLUTION INTRAVENOUS at 12:58

## 2022-03-14 RX ADMIN — LIDOCAINE HYDROCHLORIDE 40 MG: 20 INJECTION, SOLUTION INFILTRATION; PERINEURAL at 10:12

## 2022-03-14 RX ADMIN — FENTANYL CITRATE 50 MCG: 50 INJECTION, SOLUTION INTRAMUSCULAR; INTRAVENOUS at 10:28

## 2022-03-14 RX ADMIN — Medication 0.5 MG: at 14:13

## 2022-03-14 RX ADMIN — PROPOFOL 150 MG: 10 INJECTION, EMULSION INTRAVENOUS at 10:11

## 2022-03-14 RX ADMIN — FENTANYL CITRATE 25 MCG: 50 INJECTION, SOLUTION INTRAMUSCULAR; INTRAVENOUS at 13:35

## 2022-03-14 RX ADMIN — CEFAZOLIN SODIUM 2 G: 2 SOLUTION INTRAVENOUS at 14:14

## 2022-03-14 RX ADMIN — PROPOFOL 100 MCG/KG/MIN: 10 INJECTION, EMULSION INTRAVENOUS at 13:13

## 2022-03-14 RX ADMIN — FENTANYL CITRATE 25 MCG: 50 INJECTION, SOLUTION INTRAMUSCULAR; INTRAVENOUS at 12:52

## 2022-03-14 RX ADMIN — SODIUM CHLORIDE, SODIUM LACTATE, POTASSIUM CHLORIDE, CALCIUM CHLORIDE: 600; 310; 30; 20 INJECTION, SOLUTION INTRAVENOUS at 09:39

## 2022-03-14 RX ADMIN — FENTANYL CITRATE 25 MCG: 50 INJECTION, SOLUTION INTRAMUSCULAR; INTRAVENOUS at 11:13

## 2022-03-14 RX ADMIN — EPHEDRINE SULFATE 5 MG: 50 INJECTION, SOLUTION INTRAMUSCULAR; INTRAVENOUS; SUBCUTANEOUS at 10:45

## 2022-03-14 RX ADMIN — PROPOFOL 40 MG: 10 INJECTION, EMULSION INTRAVENOUS at 12:11

## 2022-03-14 RX ADMIN — PROPOFOL 200 MCG/KG/MIN: 10 INJECTION, EMULSION INTRAVENOUS at 10:11

## 2022-03-14 RX ADMIN — OXYCODONE HYDROCHLORIDE 5 MG: 5 TABLET ORAL at 15:00

## 2022-03-14 RX ADMIN — ONDANSETRON 4 MG: 2 INJECTION INTRAMUSCULAR; INTRAVENOUS at 12:56

## 2022-03-14 RX ADMIN — LIDOCAINE HYDROCHLORIDE 60 MG: 20 INJECTION, SOLUTION INFILTRATION; PERINEURAL at 10:11

## 2022-03-14 RX ADMIN — ACETAMINOPHEN 975 MG: 325 TABLET ORAL at 09:40

## 2022-03-14 ASSESSMENT — LIFESTYLE VARIABLES: TOBACCO_USE: 0

## 2022-03-14 NOTE — OP NOTE
March 16, 2022    PREOPERATIVE DIAGNOSES:   1. Urethral Diverticulum  2. Mixed urinary incontinence  3. Myofascial tenderness of the pelvic floor  4. Pelvic floor dysfunction  5. History of UTIs    POSTOPERATIVE DIAGNOSES: Same    PROCEDURE PERFORMED:   1. Cystoscopy  2. Open excision of urethral diverticulum-modifier 22 for increased difficulty of the case    ATTENDING PHYSICIAN: Renetta Briones MD     RESIDENT(S): Samy Reyna MD    ANESTHESIA: Gen    ESTIMATED BLOOD LOSS: 156 mL    SPECIMENS:  Urethral diverticulum tissue     SIGNIFICANT FINDINGS: Urethral diverticulum was noted to be extremely inflammed and surrounding tissues very friable.  Diverticular os identified and closed in a watertight fashion    MODIFIER 22:  Urethral diverticulum very inflammed and the surrounding tissues very friable which led to increase difficulty of the case with blood loss higher than usual and the length of the procedure to be 2-3 times the normal length.     HISTORY OF PRESENT ILLNESS: Marielena Ramirez is a(n) 56 year old  female with a history of mixed incontinence, UTIs and urethral diverticulum found on MRI in November . Differing management options were discussed with the patient and she has elected to proceed with excision of the urethral diverticulum    PROCEDURE IN DETAIL: Following informed written consent, the patient was transported to the operating room.  A time out was held to correctly identify the patient, procedure(s), and surgical location.  IV antibiotics were administered by anesthesia.  She was positioned in dorsal lithotomy in supportive yellowfin stirrups with care in neural safety in positioning all four extremities.  Patient was thenprepped and draped in the standard sterile fashion.     A 22Fr rigid cystoscope was placed into a well lubricated urethra and advanced to the bladder. No foreign bodies, stones, masses or bladder diverticulum were appreciated. The cystoscope was then slowly backed out  through the urethra, but no obvious diverticular communication was appreciated and the cystoscope removed and replaced with a 16Fr silicone cade catheter. Next a lonestar with stays were used to aid in visualization.     A 1.5 cm saggital incision was made starting approximately 1 cm from the outer urethral meatus at the midurethral zone where the urethral diverticulum was noted.  Dissection was then performed in 2 layers extending laterally around friable tissue that appeared to be the rind of the diverticulum which was very inflammed. This was carried out laterally until we were just underneath the pubic bone bilaterally.  Once the area of bulging was dissected such that we were able to visualize the urethra. Bleeding along the dissection was more than usual likely given the significant inflammation.  This was controlled with selective electrocautery and multiple 3-0 vicryl sutures to close off bleeding vessels. Once the rind of the diverticulum was adequately isolated, a small incision was made through the rind with return of a small amount of purulent fluid. The incision was deepened and found to have entered the urethra on the R inferio-lateral aspect.     As we continued our dissection further there was obvious inflammed tissue noted no, however no obvious diverticular space had been clearly recognized. There was significant inflammation of the periurethral tissues and along the diverticulum that made the disection take much longer as the tissue was very friable.  At which point a flexible cystoscope was then placed into the urethra. The scope was slowly backed out of the urethra and a small os was appreciated on the 5 o'clock position in addition to the urethrotomy that was just formed. The end of the scope was directed at the os and there was appreciable dilation of fluid in the left periurethral space suspicious for a continuation of the diverticulum. Dissection was then carried out over this suspicious  area while the cystoscope remained in place and a clear diverticular space was identified. Additionally, a lacrimal probe was placed within this space and visualized traveling through the os and into the urethra. A 3-0 PDS was then used to close the diverticular communication with a figure of eight and was found to be water tight. Attention was then placed on the other urethrotomy for which it was repaired in a similar fashion. The cystoscope was then removed and a 16-Macanese cade catheter replaced.  Then a small tissue flap from the dissection was brought over the urethra to cover the two urethral incisions.     The rind of the diverticulum was then dissected off the urethra using mets and sent for pathology. The deep lateral borders of the dissection were then brought together using 3-0 vicryl to close off any potential space.  Then better approximate the final incision and provide a tamponade effect. At this point, the two deepest layers of the incision were closed using non-overlapping 3-0 vicryl suture. The subcutaneous tissue was then brought together using two plication stitches with 3-0 vicryl suture to provide further support over the posterior wall of the urethra. The superficial vaginal tissue was then approximated using 3-0 vicryl in an interrupted manner with special care to ensure there is no overlapping suture lines. Last the vaginal epithelial tissue was closed using a running 4-0 Monocryl. The incision was inspected and demonstrated adequate hemostasis. The vagina was then packed with kerlix soaked in premarin cream and a long tail was secured to the cade catheter for easy future removal. The patient was awoken from anesthesia & transported to the PACU in stable condition.    Post-op plan:   - PACU for recovery  - cade catheter to remain in place for 4 weeks  - start bactrim and continue until cade is removed   - Ibuprofen and oarco for pain control   - Okay to discharge home once medically  stable   - Will follow-up with Dr. Brinoes on 4/7/22 for wound check and cade removal      Samy Reyna MD  Urology PGY1     Addendum:    I, Renetta Briones, was present and scrubbed for the entire case.  I agree with the note as above with changes made as needed.  I spoke to the patient and her  via the in person  in the PACU.    Renetta Briones MD MPH  (she/her/hers)   of Urology  [facility]

## 2022-03-14 NOTE — PROGRESS NOTES
March 14, 2022    Patient seen in the preoperative area with her .  A  was present for the entire discussion.  She denies any changes in her health since last visit, confirmed taking the antibiotics and notes her dysuria is better..  Allergies confirmed.  Procedure and its risks again discussed.     We discussed that the risks to the procedure include but not limited to cardiovascular risks of anesthesia, nerve injury, bleeding, infection, persistent or worsening stress incontinence or urge incontinence, need for post-operative cade catheter, urine stream spraying, need for further procedures including for further incontinence procedures, urethral stricture or urethrovaginal fistula.  Patient expressed understanding and agreeable to proceed.    At this time questions answered and consents signed.  Plan to proceed as scheduled.    Renetta Briones MD MPH  (she/her/hers)   of Urology  [facility]

## 2022-03-14 NOTE — ANESTHESIA CARE TRANSFER NOTE
Patient: Marielena Ramirez    Procedure: Procedure(s):  CYSTOSCOPY, WITH OPEN EXCISION OF URETHRAL DIVERTICULUM       Diagnosis: Urethral diverticulum [N36.1]  Diagnosis Additional Information: No value filed.    Anesthesia Type:   General     Note:    Oropharynx: oropharynx clear of all foreign objects and spontaneously breathing  Level of Consciousness: awake  Oxygen Supplementation: face mask    Independent Airway: airway patency satisfactory and stable  Dentition: dentition unchanged  Vital Signs Stable: post-procedure vital signs reviewed and stable  Report to RN Given: handoff report given  Patient transferred to: PACU    Handoff Report: Identifed the Patient, Identified the Reponsible Provider, Reviewed the pertinent medical history, Discussed the surgical course, Reviewed Intra-OP anesthesia mangement and issues during anesthesia, Set expectations for post-procedure period and Allowed opportunity for questions and acknowledgement of understanding      Vitals:  Vitals Value Taken Time   /81 03/14/22 1438   Temp 96.7    Pulse 100 03/14/22 1438   Resp 13 03/14/22 1440   SpO2 100 % 03/14/22 1440   Vitals shown include unvalidated device data.    Electronically Signed By: EDA Mcdermott CRNA  March 14, 2022  2:41 PM

## 2022-03-14 NOTE — LETTER
[facility] OR [address]  [address] FLO[address]  [facility] [address] [phone]      3/14/2022    Re: Marielena Ramirez      TO WHOM IT MAY CONCERN:    Marielena Ramirez was seen and had surgery today March 14, 2022.  Based on her surgery she needs to be excused from work for the next 6 weeks.  She will have another follow up with us prior to her returning to work    Please let us know if there are any questions at the [facility] [phone]          Renetta Briones MD MPH  (she/her/hers)   of Urology  [facility]

## 2022-03-14 NOTE — DISCHARGE INSTRUCTIONS
Emptying and Cleaning Your Urinary Catheter Bag  You have an indwelling urinary catheter. This drains urine from your bladder into a bag. The bag can be one that is used at your bedside. Or it can be a smaller bag that is strapped to your leg. Follow the steps below to empty and clean a urinary bag.       Drain Clean tube Clean catheter   Step 1. Drain the bag    Wash your hands well with soap and water to prevent infecting the urinary catheter and bag.    If the short drainage tube is inserted into a pocket on the bag, take the drainage tube out of the pocket.    Hold the drainage tube over a toilet or measuring container. Open the valve.    Don t touch the tip of the valve or let it touch the toilet or container.    Wash your hands again.  Step 2. Clean the drainage tube    When the bag is empty, clean the tip of the drainage valve with an alcohol wipe.    Close the valve.    Reinsert the drainage tube into the pocket, if there is one.  Step 3. Clean your skin    Wash your hands well before and after cleaning your skin.    If you have a catheter (such as a Meadows) that enters through the urethra, clean the urethral area with soap and water 1 time(s) daily as you were taught by your healthcare provider. You should also clean after every bowel movement to prevent infection.  ? Don't pull on the tubing when cleaning so you don t injure the urethra.  ? Don t apply powder to the genital area or to the tubing.    If you have a suprapubic catheter, your healthcare provider will tell you how to clean your skin around the catheter. This is a catheter that was surgically placed into the bladder through the lower abdomen.  Step 4. Check and clean the catheter tubing    Check the tubing. If there are kinks, cracks, clogs, or you can t see into the tubing, you ll need to change to new tubing as you were shown by your healthcare provider.    If the current tubing can still be used, wash it with soap and water. Always wash the  tubing in the direction away from your body. Don't pull on the tubing.    Dry the tubing with a clean washcloth or paper towel.  Step 5. Keep the drainage bag clean.    If  you have a catheter in place long term, talk to you provider about if and how often you should clean your drainage bag.  ? If need, clean your drainage bag by following these steps:    Wash your hands well with soap and water.    Disconnect the bag from the catheter tubing. Connect the tubing to the backup bag or drainage device.    Drain any remaining urine from the bag you just disconnected. Close the drainage valve.    Pour some warm (not hot) soapy water into the bag. Swish the soap around, being sure to get the corners of the bag.    Open the drainage valve to drain the soap. Close the valve.  ? Ask your healthcare provider how often you should clean your bag and what solution you should use to reduce odor and keep your bag free of germs.  ? Shake the solution a bit and allow it to remain in the bag for 30 minutes.  ? Drain the solution and rinse the bag with cold tap water.  ? Hang the bag to drain and air-dry.  When to call your healthcare provider  Call your healthcare provider right away if you have any of the following:    Little or no urine flowing into the bag    Urine leaking where the catheter enters the body    Pain, burning, or redness of the area where the catheter enters the body    Bloody urine (a trace of blood is normal)    Cloudy or foul-smelling urine, or sand-like grains in your urine    Pain in your lower back or lower abdomen    Your catheter falls out    Fever of 100.4 F (38 C) or higher, or as directed by your healthcare provider    Shaking chills      Discharge Instructions: Caring for Your Leg Bag  You are going home with a urinary catheter and collection device (drainage bag) in place. One type of collection device is called a leg bag. This is a smaller drainage bag that you can wear on your leg to collect urine  during the day. The bag can fit under your clothing.   Home care    Wash your hands thoroughly before and after you care for your catheter or collection device.    Gather your supplies:  ? Alcohol wipes  ? Soap and water  ? Towel and washcloth  ? Leg strap and leg bag    Use soap and water to wash the area where your catheter enters your body. Rinse well.    Secure the bag russell to your leg:  ? Put the leg band high on your thigh with the product label pointing away from your leg.  ? Stretch the leg band in place and fasten.  ? Place the catheter tubing over the bag and secure it. You may secure it with a Velcro tab or other method, depending on the product you use. Be sure to leave enough loop in the catheter above the leg band so you won't pull on the tube.  ? Every 4 to 6 hours, reposition the band. This will prevent pressure from the elastic on your leg. You can do this by changing the bag to the other leg or by raising or lowering the leg band.  ? Wash the band as often as needed. You can hand wash and dry the leg band.    Place the bag in the bag russell.    Clean the urine bag end of the catheter and your catheter port with an alcohol wipe.    Place a towel under the bag and port to keep urine from dripping onto your leg.    Before connecting the outlet valve at the bottom of the bag to the catheter, make sure that it is firmly closed. Flip the valve upward toward the bag. It needs to snap firmly in place. Be sure not to tug on the tubing. Be gentle.    Attach the urine bag to the end of the catheter. Insert the connector snugly into the catheter port. You can prevent dribbling urine by bending the catheter tubing just below the tip and holding it while you disconnect it from the catheter. Be careful to keep the tip clean while connecting the leg bag tubing to the catheter--this keeps germs from getting into the system.    Drain the bag when it's full. To drain the bag, flip the clamp downward. Direct the  flexible outlet tube to control the flow of urine. You don t have to disconnect the leg bag from the catheter to empty it. Raise your leg up to the edge of the toilet to reach the leg bag. Then you can empty the bag directly into the toilet. This way, you won t need to bend over, which may be uncomfortable.    Keep the leg bag clean.    Ask your healthcare provider how often you should clean your bag and what solution you should use ?to reduce odor and keep the bag free of germs.    Shake the solution a bit and allow it to remain in the bag for 30 minutes.      Drain the solution and rinse the bag with cold tap water.    Hang the bag to drain and air dry.    Remember to keep the drainage bag below the level of your bladder for proper drainage.  Follow-up  Make a follow-up appointment as directed by your healthcare provider.  When to call your healthcare provider  Call your healthcare provider right away if you have any of the following:    Redness, swelling, or warmth around the catheter entry site    Pus draining from your catheter entry site or into the catheter tubing and bag    Blood, clots, or floating debris in the urine    Nausea and vomiting    Shaking chills    Fever above 100.4 F (38 C), or as directed by your healthcare provider    Pain that is not relieved by medicine     Catheter that falls out or is dislodged           [facility]  Home Care Following Anesthesia  For 24 hours after surgery:  1. Get plenty of rest.  A responsible adult must stay with you for at least 24 hours after you leave the surgery center.  2. Do not drive or use heavy equipment.  If you have weakness or tingling, don't drive or use heavy equipment until this feeling goes away.   3. Do not drink alcohol.   4. Avoid strenuous or risky activities.  Ask for help when climbing stairs.  5. You may feel lightheaded.  IF so, sit for a few minutes before standing.  Have someone help you get up.   6. If  you have nausea (feel sick to your stomach): Drink only clear liquids such as apple juice, ginger ale, broth or 7-Up.  Rest may also help.  Be sure to drink enough fluids.  Move to a regular diet as you feel able.   7. You may have a slight fever.  Call the doctor if your fever is over 100 F (37.7 C) (taken under the tongue) or lasts longer than 24 hours.  8. You may have a dry mouth, a sore throat, muscle aches or trouble sleeping. These should go away after 24 hours.  9. Do not make important or legal decisions.   10. It is recommended to avoid smoking.               Tips for taking pain medications  To get the best pain relief possible, remember these points:    Take pain medications as directed, before pain becomes severe.    Pain medication can upset your stomach: taking it with food may help.    Constipation is a common side effect of pain medication. Drink plenty of  fluids.    Eat foods high in fiber. Take a stool softener if recommended by your doctor or pharmacist.    Do not drink alcohol, drive or operate machinery while taking pain medications.    Ask about other ways to control pain, such as with heat, ice or relaxation.    Tylenol/Acetaminophen Consumption  To help encourage the safe use of acetaminophen, the makers of TYLENOL  have lowered the maximum daily dose for single-ingredient Extra Strength TYLENOL  (acetaminophen) products sold in the [place] from 8 pills per day (4,000 mg) to 6 pills per day (3,000 mg). The dosing interval has also changed from 2 pills every 4-6 hours to 2 pills every 6 hours.    If you feel your pain relief is insufficient, you may take Tylenol/Acetaminophen in addition to your narcotic pain medication.     Be careful not to exceed 3,000 mg of Tylenol/Acetaminophen in a 24 hour period from all sources.    If you are taking extra strength Tylenol/acetaminophen (500 mg), the maximum dose is 6 tablets in 24 hours.    If you are taking regular strength acetaminophen (325 mg), the  maximum dose is 9 tablets in 24 hours.    Call a doctor for any of the followin. Signs of infection (fever, growing tenderness at the surgery site, a large amount of drainage or bleeding, severe pain, foul-smelling drainage, redness, swelling).  2. It has been over 8 to 10 hours since surgery and you are still not able to urinate (pass water).  3. Headache for over 24 hours.  4. Numbness, tingling or weakness the day after surgery (if you had spinal anesthesia).  5. Signs of Covid-19 infection (temperature over 100 degrees, shortness of breath, cough, loss of taste/smell, generalized body aches, persistent headache, chills, sore throat, nausea/vomiting/diarrhea)  Your doctor is:  Dr. Renetta Briones, Prostate and Urology: [phone]                    Or dial [phone] and ask for the resident on call for:  Prostate Urology  For emergency care, call the:  [facility]:  [phone] (TTY for hearing impaired: [phone])     DIRECT LINE: [phone]  Or [phone] OPTION #2

## 2022-03-14 NOTE — TELEPHONE ENCOUNTER
Left message with the  to have patient call me to schedule an appointment to come in to have her vaginal packing removed. Patient does have a catheter but does not need to be removed at this time.     Sheri Ospina, RN, BSN  Care Coordinator Urology

## 2022-03-14 NOTE — ANESTHESIA PREPROCEDURE EVALUATION
Anesthesia Pre-Procedure Evaluation    Patient: Marielena Ramirez   MRN: [id number] : 1965        Procedure : Procedure(s):  CYSTOSCOPY, WITH OPEN EXCISION OF URETHRAL DIVERTICULUM          Past Medical History:   Diagnosis Date     Gastroesophageal reflux disease without esophagitis      Lesion of bladder      Mixed incontinence      Myalgia of pelvic floor      Pelvic floor dysfunction in female      Recurrent UTI      Umbilical hernia without obstruction and without gangrene      Urethral diverticulum      Vaginal atrophy       Past Surgical History:   Procedure Laterality Date     CHOLECYSTECTOMY      10/2021     CYSTOSCOPY, BIOPSY BLADDER, COMBINED N/A 2022    Procedure: EXAM UNDER ANESTHESIA, CYSTOSCOPY, WITH BLADDER BIOPSY;  Surgeon: Renetta Briones MD;  Location: [facility] OR     HERNIA REPAIR  10/25/2021    Umbilical hernia     TUBAL LIGATION        Allergies   Allergen Reactions     Fish-Derived Products Itching      Social History     Tobacco Use     Smoking status: Never Smoker     Smokeless tobacco: Never Used   Substance Use Topics     Alcohol use: No      Wt Readings from Last 1 Encounters:   22 74.8 kg (165 lb)        Anesthesia Evaluation   Pt has had prior anesthetic. Type: General.    History of anesthetic complications  - PONV.      ROS/MED HX  ENT/Pulmonary:    (-) tobacco use, asthma, sleep apnea and recent URI   Neurologic:  - neg neurologic ROS     Cardiovascular:  - neg cardiovascular ROS   (+) -----No previous cardiac testing     METS/Exercise Tolerance: >4 METS    Hematologic:  - neg hematologic  ROS  (-) history of blood clots and history of blood transfusion   Musculoskeletal:  - neg musculoskeletal ROS     GI/Hepatic: Comment: S/P cholecystectomy 10/2021  S/p umbilical hernia repair 10/2021     (+) GERD, Asymptomatic on medication,     Renal/Genitourinary: Comment: Recurrent UTIs  Bladder lesion  Urethral diverticulum  Pelvic floor dysfunction      Endo:  - neg  endo ROS     Psychiatric/Substance Use:  - neg psychiatric ROS     Infectious Disease:  - neg infectious disease ROS     Malignancy:  - neg malignancy ROS     Other:  - neg other ROS          Physical Exam    Airway        Mallampati: III   TM distance: > 3 FB   Neck ROM: full   Mouth opening: > 3 cm    Respiratory Devices and Support         Dental     Comment: Overall poor dentition, no lose       B=Bridge, C=Chipped, L=Loose, M=Missing    Cardiovascular          Rhythm and rate: regular and normal     Pulmonary           breath sounds clear to auscultation           OUTSIDE LABS:  CBC: No results found for: WBC, HGB, HCT, PLT  BMP: No results found for: NA, POTASSIUM, CHLORIDE, CO2, BUN, CR, GLC  COAGS: No results found for: PTT, INR, FIBR  POC: No results found for: BGM, HCG, HCGS  HEPATIC: No results found for: ALBUMIN, PROTTOTAL, ALT, AST, GGT, ALKPHOS, BILITOTAL, BILIDIRECT, LIZETTE  OTHER: No results found for: PH, LACT, A1C, BERE, PHOS, MAG, LIPASE, AMYLASE, TSH, T4, T3, CRP, SED    Anesthesia Plan    ASA Status:  3   NPO Status:  NPO Appropriate    Anesthesia Type: General.     - Airway: LMA      Maintenance: Balanced.        Consents         - Extended Intubation/Ventilatory Support Discussed: No.      - Patient is DNR/DNI Status: No    Use of blood products discussed: No .     Postoperative Care       PONV prophylaxis: Ondansetron (or other 5HT-3), Scopolamine patch, Background Propofol Infusion, Dexamethasone or Solumedrol     Comments:                Tramaine Webb

## 2022-03-14 NOTE — BRIEF OP NOTE
[facility]    Brief Operative Note    Pre-operative diagnosis: Urethral diverticulum [N36.1]  Post-operative diagnosis Same as pre-operative diagnosis    Procedure: Procedure(s):  CYSTOSCOPY, WITH OPEN EXCISION OF URETHRAL DIVERTICULUM  Surgeon: Surgeon(s) and Role:     * Renetta Briones MD - Primary  Anesthesia: General   Estimated Blood Loss: 156 mL from 3/14/2022 10:06 AM to 3/14/2022  2:34 PM      Drains: 16Fr straight Meadows catheter with 10cc in the balloon   Specimens:   ID Type Source Tests Collected by Time Destination   1 : Urethral Divericulum Tissue Urethra SURGICAL PATHOLOGY EXAM Renetta Briones MD 3/14/2022 12:33 PM      Findings:   Diverticular rind and periurethral diverticulum.  Complications: None.  Implants: * No implants in log *      Samy Reyna MD  Urology PGY1

## 2022-03-14 NOTE — ANESTHESIA POSTPROCEDURE EVALUATION
Patient: Marielena Ramirez    Procedure: Procedure(s):  CYSTOSCOPY, WITH OPEN EXCISION OF URETHRAL DIVERTICULUM       Anesthesia Type:  General    Note:  Disposition: Outpatient   Postop Pain Control: Uneventful            Sign Out: Well controlled pain   PONV: No   Neuro/Psych: Uneventful            Sign Out: Acceptable/Baseline neuro status   Airway/Respiratory: Uneventful            Sign Out: Acceptable/Baseline resp. status   CV/Hemodynamics: Uneventful            Sign Out: Acceptable CV status; No obvious hypovolemia; No obvious fluid overload   Other NRE: NONE   DID A NON-ROUTINE EVENT OCCUR? No           Last vitals:  Vitals Value Taken Time   /78 03/14/22 1445   Temp 35.9  C (96.7  F) 03/14/22 1438   Pulse 111 03/14/22 1445   Resp 22 03/14/22 1447   SpO2 97 % 03/14/22 1448   Vitals shown include unvalidated device data.    Electronically Signed By: Paul Donato MD, MD  March 14, 2022  3:55 PM

## 2022-03-15 ENCOUNTER — APPOINTMENT (OUTPATIENT)
Dept: INTERPRETER SERVICES | Facility: CLINIC | Age: 57
End: 2022-03-15
Payer: COMMERCIAL

## 2022-03-15 ENCOUNTER — OFFICE VISIT (OUTPATIENT)
Dept: UROLOGY | Facility: CLINIC | Age: 57
End: 2022-03-15
Payer: COMMERCIAL

## 2022-03-15 ENCOUNTER — APPOINTMENT (OUTPATIENT)
Dept: ULTRASOUND IMAGING | Facility: CLINIC | Age: 57
End: 2022-03-15
Attending: EMERGENCY MEDICINE
Payer: COMMERCIAL

## 2022-03-15 ENCOUNTER — NURSE TRIAGE (OUTPATIENT)
Dept: UROLOGY | Facility: CLINIC | Age: 57
End: 2022-03-15

## 2022-03-15 VITALS
HEART RATE: 80 BPM | DIASTOLIC BLOOD PRESSURE: 62 MMHG | TEMPERATURE: 98.2 F | WEIGHT: 165 LBS | BODY MASS INDEX: 28.17 KG/M2 | RESPIRATION RATE: 18 BRPM | OXYGEN SATURATION: 98 % | SYSTOLIC BLOOD PRESSURE: 100 MMHG | HEIGHT: 64 IN

## 2022-03-15 DIAGNOSIS — N36.1 URETHRAL DIVERTICULUM: Primary | ICD-10-CM

## 2022-03-15 LAB
ALBUMIN SERPL-MCNC: 2.9 G/DL (ref 3.4–5)
ALP SERPL-CCNC: 84 U/L (ref 40–150)
ALT SERPL W P-5'-P-CCNC: 126 U/L (ref 0–50)
ANION GAP SERPL CALCULATED.3IONS-SCNC: 4 MMOL/L (ref 3–14)
AST SERPL W P-5'-P-CCNC: 101 U/L (ref 0–45)
BASOPHILS # BLD AUTO: 0 10E3/UL (ref 0–0.2)
BASOPHILS NFR BLD AUTO: 0 %
BILIRUB SERPL-MCNC: 0.4 MG/DL (ref 0.2–1.3)
BUN SERPL-MCNC: 19 MG/DL (ref 7–30)
CALCIUM SERPL-MCNC: 8.5 MG/DL (ref 8.5–10.1)
CHLORIDE BLD-SCNC: 108 MMOL/L (ref 94–109)
CO2 SERPL-SCNC: 28 MMOL/L (ref 20–32)
CREAT SERPL-MCNC: 0.8 MG/DL (ref 0.52–1.04)
EOSINOPHIL # BLD AUTO: 0 10E3/UL (ref 0–0.7)
EOSINOPHIL NFR BLD AUTO: 0 %
ERYTHROCYTE [DISTWIDTH] IN BLOOD BY AUTOMATED COUNT: 14.5 % (ref 10–15)
GFR SERPL CREATININE-BSD FRML MDRD: 87 ML/MIN/1.73M2
GLUCOSE BLD-MCNC: 127 MG/DL (ref 70–99)
HCT VFR BLD AUTO: 36.4 % (ref 35–47)
HGB BLD-MCNC: 11.7 G/DL (ref 11.7–15.7)
IMM GRANULOCYTES # BLD: 0.1 10E3/UL
IMM GRANULOCYTES NFR BLD: 1 %
LACTATE SERPL-SCNC: 1.8 MMOL/L (ref 0.7–2)
LYMPHOCYTES # BLD AUTO: 1.9 10E3/UL (ref 0.8–5.3)
LYMPHOCYTES NFR BLD AUTO: 14 %
MCH RBC QN AUTO: 28.7 PG (ref 26.5–33)
MCHC RBC AUTO-ENTMCNC: 32.1 G/DL (ref 31.5–36.5)
MCV RBC AUTO: 89 FL (ref 78–100)
MONOCYTES # BLD AUTO: 0.9 10E3/UL (ref 0–1.3)
MONOCYTES NFR BLD AUTO: 6 %
NEUTROPHILS # BLD AUTO: 11 10E3/UL (ref 1.6–8.3)
NEUTROPHILS NFR BLD AUTO: 79 %
NRBC # BLD AUTO: 0 10E3/UL
NRBC BLD AUTO-RTO: 0 /100
PLATELET # BLD AUTO: 188 10E3/UL (ref 150–450)
POTASSIUM BLD-SCNC: 4.3 MMOL/L (ref 3.4–5.3)
PROT SERPL-MCNC: 6.2 G/DL (ref 6.8–8.8)
RBC # BLD AUTO: 4.07 10E6/UL (ref 3.8–5.2)
SODIUM SERPL-SCNC: 140 MMOL/L (ref 133–144)
WBC # BLD AUTO: 13.9 10E3/UL (ref 4–11)

## 2022-03-15 PROCEDURE — 76770 US EXAM ABDO BACK WALL COMP: CPT | Mod: 26 | Performed by: RADIOLOGY

## 2022-03-15 PROCEDURE — 80053 COMPREHEN METABOLIC PANEL: CPT | Performed by: EMERGENCY MEDICINE

## 2022-03-15 PROCEDURE — 36415 COLL VENOUS BLD VENIPUNCTURE: CPT | Performed by: EMERGENCY MEDICINE

## 2022-03-15 PROCEDURE — 96361 HYDRATE IV INFUSION ADD-ON: CPT | Performed by: EMERGENCY MEDICINE

## 2022-03-15 PROCEDURE — 250N000013 HC RX MED GY IP 250 OP 250 PS 637: Performed by: STUDENT IN AN ORGANIZED HEALTH CARE EDUCATION/TRAINING PROGRAM

## 2022-03-15 PROCEDURE — 96360 HYDRATION IV INFUSION INIT: CPT | Performed by: EMERGENCY MEDICINE

## 2022-03-15 PROCEDURE — 85025 COMPLETE CBC W/AUTO DIFF WBC: CPT | Performed by: EMERGENCY MEDICINE

## 2022-03-15 PROCEDURE — 83605 ASSAY OF LACTIC ACID: CPT | Performed by: EMERGENCY MEDICINE

## 2022-03-15 PROCEDURE — 76770 US EXAM ABDO BACK WALL COMP: CPT

## 2022-03-15 PROCEDURE — 99024 POSTOP FOLLOW-UP VISIT: CPT

## 2022-03-15 PROCEDURE — 258N000003 HC RX IP 258 OP 636: Performed by: EMERGENCY MEDICINE

## 2022-03-15 PROCEDURE — 250N000009 HC RX 250: Performed by: EMERGENCY MEDICINE

## 2022-03-15 RX ORDER — OXYBUTYNIN CHLORIDE 10 MG/1
10 TABLET, EXTENDED RELEASE ORAL DAILY
Qty: 30 TABLET | Refills: 0 | Status: SHIPPED | OUTPATIENT
Start: 2022-03-15 | End: 2023-06-15

## 2022-03-15 RX ORDER — ACETAMINOPHEN 325 MG/1
975 TABLET ORAL EVERY 4 HOURS PRN
Status: DISCONTINUED | OUTPATIENT
Start: 2022-03-15 | End: 2022-03-15 | Stop reason: HOSPADM

## 2022-03-15 RX ORDER — ATROPA BELLADONNA AND OPIUM 16.2; 3 MG/1; MG/1
30 SUPPOSITORY RECTAL ONCE
Status: COMPLETED | OUTPATIENT
Start: 2022-03-15 | End: 2022-03-15

## 2022-03-15 RX ADMIN — ATROPA BELLADONNA AND OPIUM 1 SUPPOSITORY: 16.2; 3 SUPPOSITORY RECTAL at 03:51

## 2022-03-15 RX ADMIN — SODIUM CHLORIDE 1000 ML: 9 INJECTION, SOLUTION INTRAVENOUS at 02:04

## 2022-03-15 RX ADMIN — ACETAMINOPHEN 975 MG: 325 TABLET, FILM COATED ORAL at 02:46

## 2022-03-15 NOTE — DISCHARGE INSTRUCTIONS
Stop taking the previous ditropan you were prescribed, and start taking the new medication. Return with any concerns. Follow up with Urology as previously directed.

## 2022-03-15 NOTE — PROGRESS NOTES
Spoke with the patient with the  this morning. Patient reports she has to urinate around the catheter because the urine does not come out of the tube. The patient the drainage bag is attached to her leg.   Asked patient to come in but would call her back to give her a time but the patient did not answer. Left message to have the patient come at 11 am.   We want to make sure to let the patient know she does not have to push urine out and that the catheter will catch the urine. Remove the leg bag to place large drainage bag and just educate the patient what should be happening. Will try reaching the patient again.     Sheri Ospina, RN, BSN  Care Coordinator Urology

## 2022-03-15 NOTE — PROGRESS NOTES
Marielena Ramirez comes into clinic today at the request of Dr. Briones with the diagnosis of urethral diverticulum for a catheter exchange.    Order has been verified: Yes.        Allergies   Allergen Reactions     Fish-Derived Products Itching       Removal:  16 Fr straight tipped silicone cade catheter removed from urethral meatus without difficulty after removing 10 mL of fluid from the balloon.    Insertion:  18 Fr straight tipped silicone cade catheter inserted into urethral meatus in the usual sterile fashion without difficulty.  Received > 700 ml yellow positive urine return.   Balloon filled with 10 mL sterile H2O after positive urine return.  Catheter secured in place with leg strap: Yes.     Patient did tolerate procedure well.     Patient instructed as to where to call or go for pain, fever, leakage, or decreased urine flow. Pt currently taking antibiotics from procedure. Did not administer for this visit.     This service provided today was under the direct supervision of Dr. [place], who was available if needed.    Yenny Del Rosario RN   3/15/2022  3:03 PM

## 2022-03-15 NOTE — ED PROVIDER NOTES
[place] EMERGENCY DEPARTMENT ([facility])  3/14/22  History     Chief Complaint   Patient presents with     Catheter Problem     The history is provided by the patient and medical records. A  was used ([place] iPad ).     Marielena Ramirez is a 56-year-old Guinean-speaking female (iPad  used for the interview/exam) with a past medical history significant for s/p urethral diverticulum (03/14/2022), myalgia of the pelvic floor, lesion of bladder, and recurrent UTI who presents today stating that her Meadows catheter has not drained at all today. She had a urethral diverticulum excision this morning, was discharged in the hospital around 10 AM, approximately 14-1/2 hours prior to my seeing her.  She states she has not drained at all into the Meadows bag.  She states she has had progressively increased suprapubic pain.  She denies any other complaints such as documented fever, nausea, shortness of breath, chest pain, diarrhea.    Past Medical History:   Diagnosis Date     Gastroesophageal reflux disease without esophagitis      Lesion of bladder      Mixed incontinence      Myalgia of pelvic floor      Pelvic floor dysfunction in female      Recurrent UTI      Umbilical hernia without obstruction and without gangrene      Urethral diverticulum      Vaginal atrophy        Past Surgical History:   Procedure Laterality Date     CHOLECYSTECTOMY      10/2021     CYSTOSCOPY, BIOPSY BLADDER, COMBINED N/A 2/7/2022    Procedure: EXAM UNDER ANESTHESIA, CYSTOSCOPY, WITH BLADDER BIOPSY;  Surgeon: Renetta Briones MD;  Location: [facility] OR     HERNIA REPAIR  10/25/2021    Umbilical hernia     TUBAL LIGATION  2008       Family History   Problem Relation Age of Onset     Cancer Mother      Heart Disease Sister         had heart surgery     Anesthesia Reaction No family hx of      Deep Vein Thrombosis (DVT) No family hx of        Social History     Tobacco Use     Smoking status:  "Never Smoker     Smokeless tobacco: Never Used   Substance Use Topics     Alcohol use: No       Current Facility-Administered Medications   Medication     acetaminophen (TYLENOL) tablet 975 mg     Current Outpatient Medications   Medication     oxybutynin ER (DITROPAN-XL) 10 MG 24 hr tablet     acetaminophen (TYLENOL) 325 MG tablet     celecoxib (CELEBREX) 100 MG capsule     esomeprazole (NEXIUM) 20 MG DR capsule     HYDROcodone-acetaminophen (NORCO) 5-325 MG tablet     ibuprofen (ADVIL/MOTRIN) 600 MG tablet     phenazopyridine (PYRIDIUM) 100 MG tablet     SENNA-docusate sodium (SENNA S) 8.6-50 MG tablet     sulfamethoxazole-trimethoprim (BACTRIM DS) 800-160 MG tablet        Allergies   Allergen Reactions     Fish-Derived Products Itching        I have reviewed the Medications, Allergies, Past Medical and Surgical History, and Social History in the Epic system.    Review of Systems  A complete review of systems was performed with pertinent positives and negatives noted in the HPI, and all other systems negative.    Physical Exam   BP: 96/60  Pulse: 86  Temp: 98.2  F (36.8  C)  Resp: 18  Height: 162.6 cm (5' 4\")  Weight: 74.8 kg (165 lb)  SpO2: 94 %      Physical Exam  Constitutional:       General: She is not in acute distress.     Appearance: She is not diaphoretic.   HENT:      Head: Atraumatic.   Eyes:      General: No scleral icterus.  Cardiovascular:      Heart sounds: Normal heart sounds.   Pulmonary:      Effort: No respiratory distress.      Breath sounds: Normal breath sounds.   Abdominal:      Palpations: Abdomen is soft.      Tenderness: There is abdominal tenderness.       Musculoskeletal:         General: No tenderness.   Skin:     General: Skin is warm.         ED Course     At 12:35 AM the patient was seen and examined by Shannan Jade MD in Room [facility].        Procedures           Results for orders placed or performed during the hospital encounter of 03/14/22 (from the past 24 hour(s))   CBC " with platelets differential    Narrative    The following orders were created for panel order CBC with platelets differential.  Procedure                               Abnormality         Status                     ---------                               -----------         ------                     CBC with platelets and d...[[id number]]  Abnormal            Final result                 Please view results for these tests on the individual orders.   Comprehensive metabolic panel   Result Value Ref Range    Sodium 140 133 - 144 mmol/L    Potassium 4.3 3.4 - 5.3 mmol/L    Chloride 108 94 - 109 mmol/L    Carbon Dioxide (CO2) 28 20 - 32 mmol/L    Anion Gap 4 3 - 14 mmol/L    Urea Nitrogen 19 7 - 30 mg/dL    Creatinine 0.80 0.52 - 1.04 mg/dL    Calcium 8.5 8.5 - 10.1 mg/dL    Glucose 127 (H) 70 - 99 mg/dL    Alkaline Phosphatase 84 40 - 150 U/L     (H) 0 - 45 U/L     (H) 0 - 50 U/L    Protein Total 6.2 (L) 6.8 - 8.8 g/dL    Albumin 2.9 (L) 3.4 - 5.0 g/dL    Bilirubin Total 0.4 0.2 - 1.3 mg/dL    GFR Estimate 87 >60 mL/min/1.73m2   Lactic acid whole blood   Result Value Ref Range    Lactic Acid 1.8 0.7 - 2.0 mmol/L   CBC with platelets and differential   Result Value Ref Range    WBC Count 13.9 (H) 4.0 - 11.0 10e3/uL    RBC Count 4.07 3.80 - 5.20 10e6/uL    Hemoglobin 11.7 11.7 - 15.7 g/dL    Hematocrit 36.4 35.0 - 47.0 %    MCV 89 78 - 100 fL    MCH 28.7 26.5 - 33.0 pg    MCHC 32.1 31.5 - 36.5 g/dL    RDW 14.5 10.0 - 15.0 %    Platelet Count 188 150 - 450 10e3/uL    % Neutrophils 79 %    % Lymphocytes 14 %    % Monocytes 6 %    % Eosinophils 0 %    % Basophils 0 %    % Immature Granulocytes 1 %    NRBCs per 100 WBC 0 <1 /100    Absolute Neutrophils 11.0 (H) 1.6 - 8.3 10e3/uL    Absolute Lymphocytes 1.9 0.8 - 5.3 10e3/uL    Absolute Monocytes 0.9 0.0 - 1.3 10e3/uL    Absolute Eosinophils 0.0 0.0 - 0.7 10e3/uL    Absolute Basophils 0.0 0.0 - 0.2 10e3/uL    Absolute Immature Granulocytes 0.1 <=0.4 10e3/uL     Absolute NRBCs 0.0 10e3/uL   US Renal Complete    Impression    RESIDENT PRELIMINARY INTERPRETATION  IMPRESSION: Unremarkable renal ultrasound. No hydronephrosis.     Medications   acetaminophen (TYLENOL) tablet 975 mg (975 mg Oral Given 3/15/22 0246)   0.9% sodium chloride BOLUS (1,000 mLs Intravenous New Bag 3/15/22 0204)   opium-belladonna (B&O SUPPRETTES) 30-16.2 MG per suppository 1 suppository (1 suppository Rectal Given 3/15/22 0561)             Assessments & Plan (with Medical Decision Making)   The patient's presentation was initially quite perplexing to me.  She had been greater than 14 hours since leaving the hospital, had minimal urine in her Meadows catheter collection bag, had not emptied it herself, did not look dehydrated, labs looked okay, though her bladder was empty on bladder scan.  I did speak with urology resident who did come and see the patient.  Unfortunately the patient's Meadows catheter fell out shortly after she arrived here.  The resident put the new catheter in, noted some leakage around the outside of the catheter.  She felt the patient was likely having bladder spasms, leaking urine around the outside of the catheter, that is why her bladder was empty.  This did seem to explain a lot of what had been unexplained as to what happened to her urine.  She did not think that there was concern for bladder rupture, did not think she required CT scan.  She did ask for bilateral renal ultrasounds which did not show significant hydronephrosis.  She ultimately, after speaking with the attending, feels the patient be discharged home after changing the formulation of Ditropan that they are prescribing.  They think this is going to be more helpful for the patient.  She is encouraged to follow-up as previously planned, return with any new or worsening symptoms, any other concerns.    Dictation Disclaimer: Some of this Note has been completed with voice-recognition dictation software. Although errors are  generally corrected real-time, there is the potential for a rare error to be present in the completed chart.      I have reviewed the nursing notes.    I have reviewed the findings, diagnosis, plan and need for follow up with the patient.    New Prescriptions    OXYBUTYNIN ER (DITROPAN-XL) 10 MG 24 HR TABLET    Take 1 tablet (10 mg) by mouth daily       Final diagnoses:   Bladder spasms       I, Lorene Davidson am serving as a trained medical scribe to document services personally performed by Shannan aJde MD, based on the provider's statements to me.      I, Shannan Jade MD, was physically present and have reviewed and verified the accuracy of this note documented by Lorene Davidson.     Shannan Jade MD  3/14/2022   [facility] EMERGENCY DEPARTMENT     Shannan Jade MD  03/15/22 0519

## 2022-03-15 NOTE — TELEPHONE ENCOUNTER
Spoke to pt with assist of . Following up with pt after ED visit. Pt reports that she doesn't believe the catheter is working. She denies any urine in drainage bag since she left the ED. Pt reports that she is going to the bathroom and is bypassing urine. Urine is yellow and clear. No blood clots noted. Pt reports pain with urination. No pain otherwise. No fever. Pt reports that she has not been drinking adequate amount of fluids due to worry of discomfort when bladder feels full and catheter is not working properly. Last BM was 2 days ago. Pt is eating and drinking ok. No abdominal pain. Chart and problems list reviewed.    Jennifer Navarrete RN MSN    Reason for Disposition    No urine in bag for > 4 hours and catheter is not kinked    Protocols used: URINARY CATHETER SYMPTOMS AND [id number]

## 2022-03-15 NOTE — ED TRIAGE NOTES
Per pt and family.Had cystoscopy today and a catheter was placed which is not draining. Having abd pain as well. Was placed this morning at 10 am and per pt has had no urine output since.

## 2022-03-15 NOTE — TELEPHONE ENCOUNTER
[facility] Call Center    Phone Message    May a detailed message be left on voicemail: yes     Reason for Call: Other: Pt called and kept stating the surgery from yesterday is working - said it numerous times - wouldnt go into much more detail regarding it, please call pt back to further discuss. Thanks!     Action Taken: Other: [facility]    Travel Screening: Not Applicable

## 2022-03-16 ENCOUNTER — PRE VISIT (OUTPATIENT)
Dept: UROLOGY | Facility: CLINIC | Age: 57
End: 2022-03-16

## 2022-03-16 PROCEDURE — 88305 TISSUE EXAM BY PATHOLOGIST: CPT | Mod: 26 | Performed by: PATHOLOGY

## 2022-03-17 ENCOUNTER — PATIENT OUTREACH (OUTPATIENT)
Dept: UROLOGY | Facility: CLINIC | Age: 57
End: 2022-03-17

## 2022-03-17 NOTE — TELEPHONE ENCOUNTER
Patient's cade catheter is draining clear yellow urine via the tubing. Patient has not needed to strain to urinate with over the tube. She denies seeing any blood surrounding the tube or inside the tubing. Patient says she will call if she has any questions about the catheter. Communication was assisted with .    Sheri Ospina RN, BSN  Care Coordinator Urology     yes...

## 2022-03-22 ENCOUNTER — PATIENT OUTREACH (OUTPATIENT)
Dept: UROLOGY | Facility: CLINIC | Age: 57
End: 2022-03-22
Payer: COMMERCIAL

## 2022-03-22 NOTE — TELEPHONE ENCOUNTER
Patient calling to report she has dull pain to lower right abdomen which she rates at 8/10. Last bowel movement was yesterday. Patient denies nausea or vomiting. She reports she placed the leg bag to her leg so she could go out the house walking. Asked to remove the leg bag and replace with large bag. Before we could complete the conversation she felt better and was able to see more urine flowing in the tubing. More of a constant flow. She is only taking tylenol for pain so far. Asked patient not to use the leg bag since her urine does not flow well enough.   The  assisted with communicating with the patient. The patient call on her own left message in Turkmen. This writer called her back with the .  Sheri Ospina, RN, BSN  Care Coordinator Urology

## 2022-03-28 ENCOUNTER — PRE VISIT (OUTPATIENT)
Dept: UROLOGY | Facility: CLINIC | Age: 57
End: 2022-03-28
Payer: COMMERCIAL

## 2022-03-28 NOTE — TELEPHONE ENCOUNTER
Reason for visit: post op - excision of urethral diverticulum        Relevant information: catheter replaced 3/15/22     Records/imaging/labs/orders: all records available     Pt called: no need for a call     At Rooming: standard

## 2022-04-07 ENCOUNTER — OFFICE VISIT (OUTPATIENT)
Dept: UROLOGY | Facility: CLINIC | Age: 57
End: 2022-04-07
Payer: COMMERCIAL

## 2022-04-07 VITALS
WEIGHT: 174 LBS | BODY MASS INDEX: 29.71 KG/M2 | HEART RATE: 82 BPM | DIASTOLIC BLOOD PRESSURE: 75 MMHG | HEIGHT: 64 IN | SYSTOLIC BLOOD PRESSURE: 102 MMHG

## 2022-04-07 DIAGNOSIS — M79.18 MYALGIA OF PELVIC FLOOR: ICD-10-CM

## 2022-04-07 DIAGNOSIS — N39.46 MIXED INCONTINENCE: ICD-10-CM

## 2022-04-07 DIAGNOSIS — N36.1 URETHRAL DIVERTICULUM: Primary | ICD-10-CM

## 2022-04-07 DIAGNOSIS — M62.89 PELVIC FLOOR DYSFUNCTION IN FEMALE: ICD-10-CM

## 2022-04-07 PROCEDURE — 99024 POSTOP FOLLOW-UP VISIT: CPT | Performed by: UROLOGY

## 2022-04-07 RX ORDER — CIPROFLOXACIN 500 MG/1
500 TABLET, FILM COATED ORAL ONCE
Status: COMPLETED | OUTPATIENT
Start: 2022-04-07 | End: 2022-04-07

## 2022-04-07 RX ADMIN — CIPROFLOXACIN 500 MG: 500 TABLET, FILM COATED ORAL at 13:15

## 2022-04-07 ASSESSMENT — PAIN SCALES - GENERAL: PAINLEVEL: WORST PAIN (10)

## 2022-04-07 NOTE — LETTER
"4/7/2022       RE: Marielena Ramirez  [address] [facility] [address]     Dear Colleague,    Thank you for referring your patient, Marielena Ramirez, to the [facility] [place] [facility]. Please see a copy of my visit note below.    April 7, 2022    Marielena was seen today for surgical followup.    Diagnoses and all orders for this visit:    Urethral diverticulum  -     ciprofloxacin (CIPRO) tablet 500 mg  -     POST-VOID RESIDUAL BLADDER SCAN    Myalgia of pelvic floor    Pelvic floor dysfunction in female    Mixed incontinence       She is healing well from the surgery. Continue postoperative restrictions and return for repeat PVR and exam in about one month, sooner if needed    Renetta Briones MD MPH  (she/her/hers)   of Urology  [facility]      Subjective    She is here today with her .  The entire visit is conducted with the assistance of an ipad .  Patient is continuing to have a lot of pain with the catheter and looking forward to having it removed. She denies any changes in health since last visit    /75   Pulse 82   Ht 1.626 m (5' 4\")   Wt 78.9 kg (174 lb)   BMI 29.87 kg/m    GENERAL: healthy, alert and no distress  EYES: Eyes grossly normal to inspection, conjunctivae and sclerae normal  HENT: normal cephalic/atraumatic.  External ears, nose and mouth without ulcers or lesions.  RESP: no audible wheeze, cough, or visible cyanosis.  No visible retractions or increased work of breathing.  Able to speak fully in complete sentences.  NEURO: Cranial nerves grossly intact, mentation intact and speech normal  PSYCH: mentation appears normal, affect normal/bright, judgement and insight intact, normal speech and appearance well-groomed  : Normal external female genitalia.  The incisions are intact.  She continues to have a high tone pelvic floor and myofascial " tenderness.  Negative CST    Labs/imaging/pathology  Urethral diverticulum, excision:  - Benign smooth muscle and connective tissue with chronic inflammation  - Negative for malignancy    Voiding trial done by nurse, please review the nursing note    CC  Patient Care Team:  No Ref-Primary, Physician as PCP - General Myhre, Alicia, DO as Resident (Student in organized health care education/training program)  Renetta Briones MD as MD (Urology)  Sheri Ospina, RN as Specialty Care Coordinator (Urology)  Renetta Briones MD as Assigned Surgical Provider            Marielena Ramirez comes into clinic today at the request of Dr. Renetta Briones with the diagnosis for a trial of void.    The following medication was given:     MEDICATION:  Ciprofloxacin  ROUTE: PO  SITE: Medication was given orally   DOSE: 500 mg  LOT #: [id number]  : [employer]  EXPIRATION DATE: 10/2023  NDC#: 2715-4976-68   Was there drug waste? No    Prior to administration, verified patient identity using patient's name and date of birth.    Drug Amount Wasted:  None.  Vial/Syringe: single      Approx 10 mL of sterile water instilled into the bladder via catheter.      Removal:  16 Fr straight tipped latex cade catheter removed from urethral meatus without difficulty after removing 9 mL of fluid from the balloon, balloon intact.    Patient voided approx 50 mL of yellow urine.     Post-void residual was: 0 mL per bladder scan.    Patient tolerated procedure well.      Education: Teaching done with patient verbally as to where to go or call  if unable to urinate post-catheter removal. Increase fluids.   Plan: Follow-up       This service provided today was under the supervising provider of the day Dr. Renetta Briones, who was available if needed.    Rose Jimenez, EMT          Again, thank you for allowing me to participate in the care of your patient.      Sincerely,    Renetta Briones MD

## 2022-04-07 NOTE — PROGRESS NOTES
"April 7, 2022    Marielena was seen today for surgical followup.    Diagnoses and all orders for this visit:    Urethral diverticulum  -     ciprofloxacin (CIPRO) tablet 500 mg  -     POST-VOID RESIDUAL BLADDER SCAN    Myalgia of pelvic floor    Pelvic floor dysfunction in female    Mixed incontinence       She is healing well from the surgery. Continue postoperative restrictions and return for repeat PVR and exam in about one month, sooner if needed    Renetta Briones MD MPH  (she/her/hers)   of Urology  [facility]      Subjective    She is here today with her .  The entire visit is conducted with the assistance of an ipad .  Patient is continuing to have a lot of pain with the catheter and looking forward to having it removed. She denies any changes in health since last visit    /75   Pulse 82   Ht 1.626 m (5' 4\")   Wt 78.9 kg (174 lb)   BMI 29.87 kg/m    GENERAL: healthy, alert and no distress  EYES: Eyes grossly normal to inspection, conjunctivae and sclerae normal  HENT: normal cephalic/atraumatic.  External ears, nose and mouth without ulcers or lesions.  RESP: no audible wheeze, cough, or visible cyanosis.  No visible retractions or increased work of breathing.  Able to speak fully in complete sentences.  NEURO: Cranial nerves grossly intact, mentation intact and speech normal  PSYCH: mentation appears normal, affect normal/bright, judgement and insight intact, normal speech and appearance well-groomed  : Normal external female genitalia.  The incisions are intact.  She continues to have a high tone pelvic floor and myofascial tenderness.  Negative CST    Labs/imaging/pathology  Urethral diverticulum, excision:  - Benign smooth muscle and connective tissue with chronic inflammation  - Negative for malignancy    Voiding trial done by nurse, please review the nursing note    CC  Patient Care Team:  No Ref-Primary, Physician as PCP - General  Myhre, " DO [place] as Resident (Student in organized health care education/training program)  Renetta Briones MD as MD (Urology)  Sheri Ospina, RN as Specialty Care Coordinator (Urology)  Renetta Briones MD as Assigned Surgical Provider

## 2022-04-07 NOTE — LETTER
Date:April 12, 2022      Provider requested that no letter be sent. Do not send.       [employer]

## 2022-04-07 NOTE — PATIENT INSTRUCTIONS
Continue your postoperative restrictions    Please let us know if you are unable to urinate or going very frequently only small amounts    Return to see us in one month, sooner if needed    It was a pleasure meeting with you today.  Thank you for allowing me and my team the privilege of caring for you today.  YOU are the reason we are here, and I truly hope we provided you with the excellent service you deserve.  Please let us know if there is anything else we can do for you so that we can be sure you are leaving completely satisfied with your care experience.

## 2022-04-07 NOTE — PROGRESS NOTES
Marielena Ramirez comes into clinic today at the request of Dr. Renetta Briones with the diagnosis for a trial of void.    The following medication was given:     MEDICATION:  Ciprofloxacin  ROUTE: PO  SITE: Medication was given orally   DOSE: 500 mg  LOT #: [id number]  : [employer]  EXPIRATION DATE: 10/2023  NDC#: 4992-6277-12   Was there drug waste? No    Prior to administration, verified patient identity using patient's name and date of birth.    Drug Amount Wasted:  None.  Vial/Syringe: single      Approx 10 mL of sterile water instilled into the bladder via catheter.      Removal:  16 Fr straight tipped latex cade catheter removed from urethral meatus without difficulty after removing 9 mL of fluid from the balloon, balloon intact.    Patient voided approx 50 mL of yellow urine.     Post-void residual was: 0 mL per bladder scan.    Patient tolerated procedure well.      Education: Teaching done with patient verbally as to where to go or call  if unable to urinate post-catheter removal. Increase fluids.   Plan: Follow-up       This service provided today was under the supervising provider of the day Dr. Renetta Briones, who was available if needed.    Rose Jimenez, EMT

## 2022-04-07 NOTE — NURSING NOTE
"Chief Complaint   Patient presents with     Surgical Followup     excision of urethral diverticulum          Blood pressure 102/75, pulse 82, height 1.626 m (5' 4\"), weight 78.9 kg (174 lb), not currently breastfeeding. Body mass index is 29.87 kg/m .    Patient Active Problem List   Diagnosis     Mixed incontinence     Urethral diverticulum     Myalgia of pelvic floor     Pelvic floor dysfunction in female     Lesion of bladder       Allergies   Allergen Reactions     Fish-Derived Products Itching       Current Outpatient Medications   Medication Sig Dispense Refill     acetaminophen (TYLENOL) 325 MG tablet Take 325-650 mg by mouth every 6 hours as needed for mild pain        celecoxib (CELEBREX) 100 MG capsule Take 100 mg by mouth        esomeprazole (NEXIUM) 20 MG DR capsule Take 20 mg by mouth every morning (before breakfast) Take 30-60 minutes before eating.        ibuprofen (ADVIL/MOTRIN) 600 MG tablet Take 600 mg by mouth        oxybutynin ER (DITROPAN-XL) 10 MG 24 hr tablet Take 1 tablet (10 mg) by mouth daily 30 tablet 0     sulfamethoxazole-trimethoprim (BACTRIM DS) 800-160 MG tablet Take 1 tablet by mouth daily 28 tablet 0       Social History     Tobacco Use     Smoking status: Never Smoker     Smokeless tobacco: Never Used   Substance Use Topics     Alcohol use: No     Drug use: No       Rose Jimenez, EMT  4/7/2022  12:37 PM  "

## 2022-04-08 ENCOUNTER — TELEPHONE (OUTPATIENT)
Dept: UROLOGY | Facility: CLINIC | Age: 57
End: 2022-04-08
Payer: COMMERCIAL

## 2022-05-04 ENCOUNTER — PRE VISIT (OUTPATIENT)
Dept: UROLOGY | Facility: CLINIC | Age: 57
End: 2022-05-04
Payer: COMMERCIAL

## 2022-05-04 NOTE — TELEPHONE ENCOUNTER
Reason for visit: Six week post op     Relevant information: excision of urethral diverticulum     Records/imaging/labs/orders: all records available    Pt called: no need for a call    At Rooming: collect a urine/pvr      Heather Colin CMA  5/4/2022  1:10 PM

## 2022-05-12 ENCOUNTER — OFFICE VISIT (OUTPATIENT)
Dept: UROLOGY | Facility: CLINIC | Age: 57
End: 2022-05-12
Payer: COMMERCIAL

## 2022-05-12 VITALS
DIASTOLIC BLOOD PRESSURE: 71 MMHG | BODY MASS INDEX: 27.31 KG/M2 | HEART RATE: 79 BPM | WEIGHT: 160 LBS | SYSTOLIC BLOOD PRESSURE: 113 MMHG | HEIGHT: 64 IN

## 2022-05-12 DIAGNOSIS — N36.1 URETHRAL DIVERTICULUM: Primary | ICD-10-CM

## 2022-05-12 DIAGNOSIS — M62.89 PELVIC FLOOR DYSFUNCTION IN FEMALE: ICD-10-CM

## 2022-05-12 DIAGNOSIS — N39.46 MIXED INCONTINENCE: ICD-10-CM

## 2022-05-12 DIAGNOSIS — M79.18 MYALGIA OF PELVIC FLOOR: ICD-10-CM

## 2022-05-12 PROCEDURE — 99024 POSTOP FOLLOW-UP VISIT: CPT | Performed by: UROLOGY

## 2022-05-12 RX ORDER — FESOTERODINE FUMARATE 4 MG/1
4 TABLET, FILM COATED, EXTENDED RELEASE ORAL DAILY
Qty: 30 TABLET | Refills: 3 | Status: SHIPPED | OUTPATIENT
Start: 2022-05-12 | End: 2023-06-15

## 2022-05-12 RX ORDER — DICYCLOMINE HCL 20 MG
TABLET ORAL
COMMUNITY
Start: 2022-04-12

## 2022-05-12 ASSESSMENT — PAIN SCALES - GENERAL: PAINLEVEL: SEVERE PAIN (7)

## 2022-05-12 NOTE — LETTER
5/12/2022       RE: Marielena Ramirez  [address] [facility] [address]     Dear Colleague,    Thank you for referring your patient, Marielena Ramirez, to the [facility] UROLOGY CLINIC [place] at [facility]. Please see a copy of my visit note below.    May 12, 2022    Marielena was seen today for surgical followup.    Diagnoses and all orders for this visit:    Urethral diverticulum  -     POST-VOID RESIDUAL BLADDER SCAN    Mixed incontinence  -     POST-VOID RESIDUAL BLADDER SCAN  -     Physical Therapy Referral; Future  -     fesoterodine fumarate (TOVIAZ) 4 MG TB24 24 hr tablet; Take 1 tablet (4 mg) by mouth daily    Myalgia of pelvic floor  -     POST-VOID RESIDUAL BLADDER SCAN  -     Physical Therapy Referral; Future    Pelvic floor dysfunction in female  -     POST-VOID RESIDUAL BLADDER SCAN  -     Physical Therapy Referral; Future       At this time she has healed well from the urethral diverticulum surgery but has continued symptoms    We discussed how her pelvic floor symptoms are related to the physical exam findings and her pelvic floor myofascial dysfunction.  We discussed how the recommended treatment is dedicated pelvic floor therapy.  We discussed how the pelvic floor physical therapy works and patient is agreeable.  Referral was placed.      RTC 6 months, sooner if needed    10 minutes were spent today on the day of the encounter in reviewing the EMR, direct patient care, coordination of care and documentation    Renetta Briones MD MPH  (she/her/hers)   of Urology  [facility]      Subjective    Patient is seen today in follow up.  The entire visit today is conducted with the assistance of a  via the iPad    Continues to have mixed incontinence.  She has pain and leakage, but better than prior to surgery.  Itching, denies bleeding.     She denies any changes in health since last visit    BP  "113/71   Pulse 79   Ht 1.626 m (5' 4\")   Wt 72.6 kg (160 lb)   BMI 27.46 kg/m    GENERAL: healthy, alert and no distress  EYES: Eyes grossly normal to inspection, conjunctivae and sclerae normal  HENT: normal cephalic/atraumatic.  External ears, nose and mouth without ulcers or lesions.  RESP: no audible wheeze, cough, or visible cyanosis.  No visible retractions or increased work of breathing.  Able to speak fully in complete sentences.  NEURO: Cranial nerves grossly intact, mentation intact and speech normal  PSYCH: mentation appears normal, affect normal/bright, judgement and insight intact, normal speech and appearance well-groomed  : normal external genitalia.  Negative ESST. The speculum exam is unremarkable, incision well healed.  Continues to have diffuse myofascial tenderness of the pelvic floor    PVR 0mL by bladder scan    CC  Patient Care Team:  [facility] as PCP - General  Myhre, Alicia, DO as Resident (Student in organized health care education/training program)  Renetta Briones MD as MD (Urology)  Sheri Ospina, RN as Specialty Care Coordinator (Urology)  Renetta Briones MD as Assigned Surgical Provider  SELF, REFERRED              Again, thank you for allowing me to participate in the care of your patient.      Sincerely,    Renetta Briones MD      "

## 2022-05-12 NOTE — LETTER
[facility] UROLOGY CLINIC [address]  [address] [facility] [address]  [phone]        May 12, 2022    RE:  Marielena Ramirez                                                                                                                                                       [address] [address]        To whom it may concern:    Marielena Ramirez is under my professional care. She was seen May 12, 2022.    She may return to work without any restrictions    Sincerely,        Renetta Briones MD MPH  (she/her/hers)   of Urology  [facility]

## 2022-05-12 NOTE — NURSING NOTE
"Chief Complaint   Patient presents with     Surgical Followup       Blood pressure 113/71, pulse 79, height 1.626 m (5' 4\"), weight 72.6 kg (160 lb), not currently breastfeeding. Body mass index is 27.46 kg/m .    Patient Active Problem List   Diagnosis     Mixed incontinence     Urethral diverticulum     Myalgia of pelvic floor     Pelvic floor dysfunction in female     Lesion of bladder       Allergies   Allergen Reactions     Fish-Derived Products Itching       Current Outpatient Medications   Medication Sig Dispense Refill     acetaminophen (TYLENOL) 325 MG tablet Take 325-650 mg by mouth every 6 hours as needed for mild pain        celecoxib (CELEBREX) 100 MG capsule Take 100 mg by mouth        dicyclomine (BENTYL) 20 MG tablet TAKE 1 TABLET BY MOUTH THREE TIMES DAILY FOR IRRITABLE BOWEL SYNDROME       esomeprazole (NEXIUM) 20 MG DR capsule Take 20 mg by mouth every morning (before breakfast) Take 30-60 minutes before eating.        ibuprofen (ADVIL/MOTRIN) 600 MG tablet Take 600 mg by mouth        oxybutynin ER (DITROPAN-XL) 10 MG 24 hr tablet Take 1 tablet (10 mg) by mouth daily 30 tablet 0     sulfamethoxazole-trimethoprim (BACTRIM DS) 800-160 MG tablet Take 1 tablet by mouth daily 28 tablet 0       Social History     Tobacco Use     Smoking status: Never Smoker     Smokeless tobacco: Never Used   Substance Use Topics     Alcohol use: No     Drug use: No       Aruna Kaur  5/12/2022  10:14 AM  "

## 2022-05-12 NOTE — PROGRESS NOTES
"May 12, 2022    Marielena was seen today for surgical followup.    Diagnoses and all orders for this visit:    Urethral diverticulum  -     POST-VOID RESIDUAL BLADDER SCAN    Mixed incontinence  -     POST-VOID RESIDUAL BLADDER SCAN  -     Physical Therapy Referral; Future  -     fesoterodine fumarate (TOVIAZ) 4 MG TB24 24 hr tablet; Take 1 tablet (4 mg) by mouth daily    Myalgia of pelvic floor  -     POST-VOID RESIDUAL BLADDER SCAN  -     Physical Therapy Referral; Future    Pelvic floor dysfunction in female  -     POST-VOID RESIDUAL BLADDER SCAN  -     Physical Therapy Referral; Future       At this time she has healed well from the urethral diverticulum surgery but has continued symptoms    We discussed how her pelvic floor symptoms are related to the physical exam findings and her pelvic floor myofascial dysfunction.  We discussed how the recommended treatment is dedicated pelvic floor therapy.  We discussed how the pelvic floor physical therapy works and patient is agreeable.  Referral was placed.      RTC 6 months, sooner if needed    10 minutes were spent today on the day of the encounter in reviewing the EMR, direct patient care, coordination of care and documentation    Renetta Briones MD MPH  (she/her/hers)   of Urology  [facility]      Subjective    Patient is seen today in follow up.  The entire visit today is conducted with the assistance of a  via the iPad    Continues to have mixed incontinence.  She has pain and leakage, but better than prior to surgery.  Itching, denies bleeding.     She denies any changes in health since last visit    /71   Pulse 79   Ht 1.626 m (5' 4\")   Wt 72.6 kg (160 lb)   BMI 27.46 kg/m    GENERAL: healthy, alert and no distress  EYES: Eyes grossly normal to inspection, conjunctivae and sclerae normal  HENT: normal cephalic/atraumatic.  External ears, nose and mouth without ulcers or lesions.  RESP: no audible wheeze, " cough, or visible cyanosis.  No visible retractions or increased work of breathing.  Able to speak fully in complete sentences.  NEURO: Cranial nerves grossly intact, mentation intact and speech normal  PSYCH: mentation appears normal, affect normal/bright, judgement and insight intact, normal speech and appearance well-groomed  : normal external genitalia.  Negative ESST. The speculum exam is unremarkable, incision well healed.  Continues to have diffuse myofascial tenderness of the pelvic floor    PVR 0mL by bladder scan    CC  Patient Care Team:  [facility] [place] as PCP - General  Myhre, Alicia, DO as Resident (Student in organized health care education/training program)  Renetta Briones MD as MD (Urology)  Sheri Ospina, RN as Specialty Care Coordinator (Urology)  Renetta Briones MD as Assigned Surgical Provider  SELF, REFERRED

## 2022-05-12 NOTE — LETTER
Date:May 24, 2022      Patient was self referred, no letter generated. Do not send.        [facility] Health Information

## 2022-05-12 NOTE — PATIENT INSTRUCTIONS
Websites with free information:    [employer] patient website: www.voicesforpfd.org    Total Control Program: www.totalcontrolprogram.com    Please take the medications as directed    Please see one of the dedicated pelvic floor physical therapist ([facility] [phone])    Please return to see me in 3 months, sooner if needed - provider requests an in-person     It was a pleasure meeting with you today.  Thank you for allowing me and my team the privilege of caring for you today.  YOU are the reason we are here, and I truly hope we provided you with the excellent service you deserve.  Please let us know if there is anything else we can do for you so that we can be sure you are leaving completely satisfied with your care experience.

## 2022-05-17 ENCOUNTER — APPOINTMENT (OUTPATIENT)
Dept: INTERPRETER SERVICES | Facility: CLINIC | Age: 57
End: 2022-05-17
Payer: COMMERCIAL

## 2022-06-21 ENCOUNTER — TELEPHONE (OUTPATIENT)
Dept: UROLOGY | Facility: CLINIC | Age: 57
End: 2022-06-21

## 2022-06-21 NOTE — TELEPHONE ENCOUNTER
Spoke to pt with assist of .  Pt had procedure done on 3/14/22. Pain improved after procedure, but noted that pain returned since last Friday. Pain was managed by ibuprofen, but no longer effective.   Pain noted underneath incisions. Pain rated 10/10. Ibuprofen helps a little. Pt is able to urinate and feels she is completely emptying bladder. No hematuria. No dysuria. Pt feels like she is bloated and incisions appear swollen. Felt feverish about a week ago, but not currently. No abnormal drainage. No flank pain. Able to eat and drink ok. Pt reports having some difficulty with doing what she needs to do daily due to pain. Chart and problems list reviewed.    Jennifer Navarrete, LESLEY MSN

## 2022-06-21 NOTE — TELEPHONE ENCOUNTER
Health Call Center    Phone Message    May a detailed message be left on voicemail: yes     Reason for Call: Symptoms or Concerns     If patient has red-flag symptoms, warm transfer to triage line    Current symptom or concern: Post op pain that has been going on for the last 15 days. Went to her PCP and they prescribed Ibuprofen that is not working well. Would like to discuss symptoms and possible medication. Please reach out to patient      Symptoms have been present for:  15 day(s)    Has patient previously been seen for this? Yes    By [facility]    Date: 6/21/22    Are there any new or worsening symptoms? Yes: Pain      Action Taken: Message routed to:  [facility] Uro    Travel Screening: Not Applicable

## 2022-06-22 NOTE — TELEPHONE ENCOUNTER
Renetta Briones MD Bratsch, Angie J, RN; Sheri Ospina, LESLEY; [facility] 10 hours ago (10:01 PM)     CF    She needs to be evaluated.

## 2022-06-22 NOTE — TELEPHONE ENCOUNTER
Jennifer Navarrete RN  You 2 hours ago (8:37 AM)     AB    Please let her know Dr. Briones advises for UC visit to be evaluated urgently as we do not have openings.     Thank you,   Jennifer Navarrete, RN MSN        Writer called pt with  services and left a detailed VM to pt to head to urgent care to be evaluated.

## 2022-06-28 ENCOUNTER — PRE VISIT (OUTPATIENT)
Dept: UROLOGY | Facility: CLINIC | Age: 57
End: 2022-06-28

## 2022-06-28 NOTE — TELEPHONE ENCOUNTER
Reason for visit: urethral pain     Relevant information: history of excision of urethral diverticulum      Records/imaging/labs/orders: all records available     Pt called: no need for a call     At Rooming: collect a urine, possible cystoscopy, undress for exam    Heather Colin CMA  6/28/2022  10:30 AM

## 2022-06-30 ENCOUNTER — OFFICE VISIT (OUTPATIENT)
Dept: UROLOGY | Facility: CLINIC | Age: 57
End: 2022-06-30
Payer: COMMERCIAL

## 2022-06-30 VITALS — HEART RATE: 85 BPM | DIASTOLIC BLOOD PRESSURE: 74 MMHG | SYSTOLIC BLOOD PRESSURE: 109 MMHG

## 2022-06-30 DIAGNOSIS — M79.18 MYALGIA OF PELVIC FLOOR: ICD-10-CM

## 2022-06-30 DIAGNOSIS — R10.2 PELVIC PAIN IN FEMALE: ICD-10-CM

## 2022-06-30 DIAGNOSIS — M62.89 PELVIC FLOOR DYSFUNCTION IN FEMALE: Primary | ICD-10-CM

## 2022-06-30 DIAGNOSIS — N39.46 MIXED INCONTINENCE: ICD-10-CM

## 2022-06-30 LAB
ALBUMIN UR-MCNC: NEGATIVE MG/DL
APPEARANCE UR: CLEAR
BILIRUB UR QL STRIP: NEGATIVE
COLOR UR AUTO: YELLOW
GLUCOSE UR STRIP-MCNC: NEGATIVE MG/DL
HGB UR QL STRIP: ABNORMAL
KETONES UR STRIP-MCNC: NEGATIVE MG/DL
LEUKOCYTE ESTERASE UR QL STRIP: ABNORMAL
NITRATE UR QL: NEGATIVE
PH UR STRIP: 6 [PH] (ref 5–8)
SP GR UR STRIP: >=1.03 (ref 1–1.03)
UROBILINOGEN UR STRIP-ACNC: 0.2 E.U./DL

## 2022-06-30 PROCEDURE — 99214 OFFICE O/P EST MOD 30 MIN: CPT | Performed by: UROLOGY

## 2022-06-30 PROCEDURE — 81003 URINALYSIS AUTO W/O SCOPE: CPT | Performed by: PATHOLOGY

## 2022-06-30 RX ORDER — IBUPROFEN 400 MG/1
TABLET, FILM COATED ORAL
COMMUNITY
Start: 2022-06-06

## 2022-06-30 RX ORDER — DIAZEPAM 10 MG
TABLET ORAL
Qty: 40 TABLET | Refills: 3 | Status: SHIPPED | OUTPATIENT
Start: 2022-06-30 | End: 2022-11-17

## 2022-06-30 ASSESSMENT — PAIN SCALES - GENERAL: PAINLEVEL: EXTREME PAIN (8)

## 2022-06-30 NOTE — PROGRESS NOTES
June 30, 2022    Marielena was seen today for recheck.    Diagnoses and all orders for this visit:    Pelvic floor dysfunction in female  -     diazepam (VALIUM) 10 MG tablet; VAGINAL VALIUM SUPPOSITORY 10MG AT NIGHT AND PRIOR TO THERAPY AS NEEDED    Myalgia of pelvic floor  -     diazepam (VALIUM) 10 MG tablet; VAGINAL VALIUM SUPPOSITORY 10MG AT NIGHT AND PRIOR TO THERAPY AS NEEDED    Mixed incontinence    Pelvic pain in female  -     diazepam (VALIUM) 10 MG tablet; VAGINAL VALIUM SUPPOSITORY 10MG AT NIGHT AND PRIOR TO THERAPY AS NEEDED    Other orders  -     [place] Urine Macroscopic POCT       Discussed with patient that at this time she needs to go to pelvic floor therapy.  We can try vaginal valium in addition but that the primary treatment for her symptoms is pelvic floor therapy    25 minutes were spent today on the day of the encounter in reviewing the EMR including reviewing her PCP notes, ED notes, urinalysis and urine culture results, direct patient care including prescription medications, coordination of care and documentation    Renetta Briones MD MPH  (she/her/hers)   of Urology  [facility]      Subjective    She had called about a week ago because of increasing pain and her appointment was moved up to today.  Patient is primarily Dominican speaking and the entire visit today is conducted with the assistance of a     She notes that the pain has been slowly increasing over time.  She notes that ibuprofen helps the pain but then wears off.      She initially saw her PCP the beginning of the month 6/6 and the urinalysis was negative (in [facility]) and went to the ED 6/26 at [facility] where urinalysis showed 6-10 WBC and culture was negative    Minimal leakage at this time    She denies any changes in health since last visit    /74   Pulse 85   GENERAL: healthy, alert and no distress  EYES: Eyes grossly normal to inspection, conjunctivae and sclerae  normal  HENT: normal cephalic/atraumatic.  External ears, nose and mouth without ulcers or lesions.  RESP: no audible wheeze, cough, or visible cyanosis.  No visible retractions or increased work of breathing.  Able to speak fully in complete sentences.  NEURO: Cranial nerves grossly intact, mentation intact and speech normal  PSYCH: mentation appears normal, affect normal/bright, judgement and insight intact, normal speech and appearance well-groomed  : Normal external female genitalia.  Speculum and bimanual exam are remarkable for high tone pelvic floor with diffuse myofascial tenderness    Labs/imaging/pathology  Urine dip trace blood and small leuks    PVR 0mL by bladder scan    CC  Patient Care Team:  [facility] as PCP - General  Myhre, Alicia, DO as Resident (Student in organized health care education/training program)  Renetta Briones MD as MD (Urology)  Sheri Ospina, RN as Specialty Care Coordinator (Urology)  Renetta Briones MD as Assigned Surgical Provider  SELF, REFERRED

## 2022-06-30 NOTE — LETTER
6/30/2022       RE: Marielena Ramirez  [address]  [facility] [address]     Dear Colleague,    Thank you for referring your patient, Marielena Ramirez, to the [facility] UROLOGY CLINIC [place] [facility]. Please see a copy of my visit note below.    June 30, 2022    Marielena was seen today for recheck.    Diagnoses and all orders for this visit:    Pelvic floor dysfunction in female  -     diazepam (VALIUM) 10 MG tablet; VAGINAL VALIUM SUPPOSITORY 10MG AT NIGHT AND PRIOR TO THERAPY AS NEEDED    Myalgia of pelvic floor  -     diazepam (VALIUM) 10 MG tablet; VAGINAL VALIUM SUPPOSITORY 10MG AT NIGHT AND PRIOR TO THERAPY AS NEEDED    Mixed incontinence    Pelvic pain in female  -     diazepam (VALIUM) 10 MG tablet; VAGINAL VALIUM SUPPOSITORY 10MG AT NIGHT AND PRIOR TO THERAPY AS NEEDED    Other orders  -     Clinitek Urine Macroscopic POCT       Discussed with patient that at this time she needs to go to pelvic floor therapy.  We can try vaginal valium in addition but that the primary treatment for her symptoms is pelvic floor therapy    25 minutes were spent today on the day of the encounter in reviewing the EMR including reviewing her PCP notes, ED notes, urinalysis and urine culture results, direct patient care including prescription medications, coordination of care and documentation    Renetta Briones MD MPH  (she/her/hers)   of Urology  [facility]      Subjective    She had called about a week ago because of increasing pain and her appointment was moved up to today.  Patient is primarily Hungarian speaking and the entire visit today is conducted with the assistance of a     She notes that the pain has been slowly increasing over time.  She notes that ibuprofen helps the pain but then wears off.      She initially saw her PCP the beginning of the month 6/6 and the urinalysis was negative (in  [place]) and went to the ED 6/26 at [facility] where urinalysis showed 6-10 WBC and culture was negative    Minimal leakage at this time    She denies any changes in health since last visit    /74   Pulse 85   GENERAL: healthy, alert and no distress  EYES: Eyes grossly normal to inspection, conjunctivae and sclerae normal  HENT: normal cephalic/atraumatic.  External ears, nose and mouth without ulcers or lesions.  RESP: no audible wheeze, cough, or visible cyanosis.  No visible retractions or increased work of breathing.  Able to speak fully in complete sentences.  NEURO: Cranial nerves grossly intact, mentation intact and speech normal  PSYCH: mentation appears normal, affect normal/bright, judgement and insight intact, normal speech and appearance well-groomed  : Normal external female genitalia.  Speculum and bimanual exam are remarkable for high tone pelvic floor with diffuse myofascial tenderness    Labs/imaging/pathology  Urine dip trace blood and small leuks    PVR 0mL by bladder scan    CC  Patient Care Team:  [facility] as PCP - General  Myhre, Alicia, DO as Resident (Student in organized health care education/training program)  Renetta Briones MD as MD (Urology)  Sheri Ospina, RN as Specialty Care Coordinator (Urology)  Renetta Briones MD as Assigned Surgical Provider  SELF, REFERRED

## 2022-06-30 NOTE — PATIENT INSTRUCTIONS
Use the petroleum jelly    Use the vaginal valium as directed    You can take over the counter ibuprofen or tylenol    Do the pelvic floor therapy as scheduled    Return to see us in October, sooner if needed    It was a pleasure meeting with you today.  Thank you for allowing me and my team the privilege of caring for you today.  YOU are the reason we are here, and I truly hope we provided you with the excellent service you deserve.  Please let us know if there is anything else we can do for you so that we can be sure you are leaving completely satisfied with your care experience.

## 2022-06-30 NOTE — NURSING NOTE
Chief Complaint   Patient presents with     RECHECK     Pelvic and urethral pain        Blood pressure 109/74, pulse 85, not currently breastfeeding. There is no height or weight on file to calculate BMI.    Patient Active Problem List   Diagnosis     Mixed incontinence     Urethral diverticulum     Myalgia of pelvic floor     Pelvic floor dysfunction in female     Lesion of bladder       Allergies   Allergen Reactions     Fish-Derived Products Itching       Current Outpatient Medications   Medication Sig Dispense Refill     acetaminophen (TYLENOL) 325 MG tablet Take 325-650 mg by mouth every 6 hours as needed for mild pain        celecoxib (CELEBREX) 100 MG capsule Take 100 mg by mouth        dicyclomine (BENTYL) 20 MG tablet TAKE 1 TABLET BY MOUTH THREE TIMES DAILY FOR IRRITABLE BOWEL SYNDROME       esomeprazole (NEXIUM) 20 MG DR capsule Take 20 mg by mouth every morning (before breakfast) Take 30-60 minutes before eating.        fesoterodine fumarate (TOVIAZ) 4 MG TB24 24 hr tablet Take 1 tablet (4 mg) by mouth daily 30 tablet 3     ibuprofen (ADVIL/MOTRIN) 400 MG tablet TAKE 1 TABLET (400 MG) BY MOUTH EVERY 6 HOURS AS NEEDED FOR PAIN.       ibuprofen (ADVIL/MOTRIN) 600 MG tablet Take 600 mg by mouth        oxybutynin ER (DITROPAN-XL) 10 MG 24 hr tablet Take 1 tablet (10 mg) by mouth daily 30 tablet 0     sulfamethoxazole-trimethoprim (BACTRIM DS) 800-160 MG tablet Take 1 tablet by mouth daily 28 tablet 0       Social History     Tobacco Use     Smoking status: Never Smoker     Smokeless tobacco: Never Used   Substance Use Topics     Alcohol use: No     Drug use: No       Heather Colin CMA  6/30/2022  2:21 PM

## 2022-07-19 ENCOUNTER — TELEPHONE (OUTPATIENT)
Dept: UROLOGY | Facility: CLINIC | Age: 57
End: 2022-07-19

## 2022-07-19 NOTE — TELEPHONE ENCOUNTER
LVM and callback number to reschedule her appt as video or schedule her on a Thursday for in patient

## 2022-07-22 ENCOUNTER — THERAPY VISIT (OUTPATIENT)
Dept: PHYSICAL THERAPY | Facility: CLINIC | Age: 57
End: 2022-07-22
Attending: UROLOGY
Payer: COMMERCIAL

## 2022-07-22 DIAGNOSIS — M79.18 MYALGIA OF PELVIC FLOOR: ICD-10-CM

## 2022-07-22 DIAGNOSIS — N39.46 MIXED INCONTINENCE: ICD-10-CM

## 2022-07-22 DIAGNOSIS — M62.89 PELVIC FLOOR DYSFUNCTION IN FEMALE: ICD-10-CM

## 2022-07-22 PROCEDURE — 97110 THERAPEUTIC EXERCISES: CPT | Mod: GP | Performed by: PHYSICAL THERAPIST

## 2022-07-22 PROCEDURE — 97161 PT EVAL LOW COMPLEX 20 MIN: CPT | Mod: GP | Performed by: PHYSICAL THERAPIST

## 2022-07-22 PROCEDURE — 97530 THERAPEUTIC ACTIVITIES: CPT | Mod: GP | Performed by: PHYSICAL THERAPIST

## 2022-07-22 NOTE — PROGRESS NOTES
Physical Therapy Initial Evaluation  Subjective:  The history is provided by the patient. A  was used.   Patient Health History  Marielena Ramirez being seen for pelvic pain.     Date of Onset: date of order 5/12/22, date of urethral diverticulum surgery: 3/14/22.      Pain is reported as 1/10 on pain scale.  General health as reported by patient is good.  Health conditions: GERD, mixed incontinence, pelvic pain, vaginal atrophy.   Red flags:  None as reported by patient.      Other surgery history details: 3/14/2022- Cystoscopy, open excision urethral diverticulum, 2/7/2022-Cystoscopy, biopsy bladder, 10/25/21, Hernia repair Umbilical hernia, 2008-Tubal ligation, Date Unknown-Cholecystectomy 10/2021.        Current occupation is cleaning.   Primary job tasks include:  Lifting/carrying.                  Therapist Assessment:   Clinical Impression: Pt presents with primary complaint of pelvic pain and urinary incontinence.  Per clinical examination, pt with overactivity of pelvic floor muscles.  Pt will benefit from skilled physical therapy for down training and coordination training of pelvic floor muscles as well as bladder health education.    Chief Complaint:  The pt presents today s/p urethral diverticulum surgery that was performed on 3/14/22. The pt reports pelvic pain and urinary incontinence that began about 4-5 years ago. The pt notes when working she will sometimes have swelling near where the urine comes out, and it will become painful. She will have a burning pain near her urethra when she works a lot. When she leans over or squats, lift heavy things, or when walking a lot she will have urethral burning pain. Her pain will start after 2 hours of working. The pt notes that rest and medication will help reduce her pain. The pt works 8 hour work days. She notes having stress incontinence and occasional leaking of urine during the day without awareness.    Current activity: was walking  is no longer walking    Goals: reduce pain and leaking of urine    Urination   Do you leak on the way to the bathroom or with a strong urge to void? Yes  She will get strong urge in the AM, see the bathroom, drink water, lifting something heavy  Do you leak with cough, sneeze, jumping, running? Sneeze, start to walk  Any other activities that cause leaking? When her bladder is full  Type of pad and number used per day? 2-3  How many times do you urinate during the day? Every 30 minutes to every 2-3 hours, varies  Can you stop the flow of urine when on the toilet? Yes  The pt notes voiding a smaller amount of urine since the surgery  Do you strain to pass urine? no  Do you have a slow or hesitant urinary stream? no  Do you have difficulty initiating the urine stream? no  What is you fluid intake per day? Water (8oz) 2 glasses of water (8oz) every 30 minutes at work- pt reports was instructed by the MD, and few sips of water in the early morning, sometimes tea and juice  Do you feel like you empty completely when voiding? Yes  Sometimes she feels pain with urination.      Bowel Habits  Frequency of bowel movements? 2-3x a day  Consistency of stool? Shade stool scale? Type 3 and 4  Do you ignore the urge to defecate? no  Do you strain to pass stool? sometimes  Do you feel that you empty completely? Once in a while will feel constipation, and she will take something that helps.     Pelvic Pain  When do you have pelvic pain? yes  Are you sexually active? No, hardly at all since surgery.  Is initial penetration during intercourse painful? Yes currently and prior to surgery  Is deeper penetration painful? Yes currently and prior to surgery  Do you use lubrication? no  Marinoff Scale:Level 3  (Level 3: Abstinence from intercourse because of severe pain. Level 2: Painful intercourse which limites frequency of activity. Level 1: Painful intercourse not severe enough to prevent activity.)  Have you given birth? 5, vaginal  deliveries, no complications  Have you ever been worried for your physical safety? no  Are you having regular exercise? Not currently, liked walking and doing exercises and would like to return doing those exercises.   Have you practiced the PF (kegel) exercise for 4 or more weeks? no    Objective:    POSTURE: forward head    FLEXIBILITY:tightness of adductors    EXTERNAL ASSESSMENT:  Skin condition: normal  Bearing down/coughing: bulge with cue to push like having a bowel movement  Muscle contraction/perineal mobility: elevation and urogenital triangle descent     INTERNAL VAGINAL ASSESSMENT:  Baseline PF tone: hyper  PF Tone with cough: not tested   PF Response quality: sluggish  PF Power: Center: 2/5  Accessory Muscle use:glutes and adductors  Endurance: Maximum contraction in seconds:2-3 second   Quick contraction repetitions prior to fatigue: not tested  Palpation: tenderness to palpation of: bulbocavernosus bilaterally and iliococcygeus bilaterally R>L    Assessment/Plan:    Patient is a 56 year old female with pelvic complaints.    Patient has the following significant findings with corresponding treatment plan.                Diagnosis 1:  Pelvic floor dysfunciton  Pain -  hot/cold therapy, US, electric stimulation, mechanical traction, manual therapy, STS, splint/taping/bracing/orthotics, self management, education, directional preference exercise and home program  Decreased ROM/flexibility - manual therapy, therapeutic exercise, therapeutic activity and home program  Decreased strength - therapeutic exercise, therapeutic activities and home program  Impaired muscle performance - biofeedback, electric stimulation, neuro re-education and home program    Therapy Evaluation Codes:   Cumulative Therapy Evaluation is: Low complexity.    Previous and current functional limitations:  (See Goal Flow Sheet for this information)    Short term and Long term goals: (See Goal Flow Sheet for this information)      Communication ability:  Patient appears to be able to clearly communicate and understand verbal and written communication and follow directions correctly.  Treatment Explanation - The following has been discussed with the patient:   RX ordered/plan of care  Anticipated outcomes  Possible risks and side effects  This patient would benefit from PT intervention to resume normal activities.   Rehab potential is good.    Frequency:  1 X week, once daily  Duration:  for 8 weeks  Discharge Plan:  Achieve all LTG.  Independent in home treatment program.  Reach maximal therapeutic benefit.    Please refer to the daily flowsheet for treatment today, total treatment time and time spent performing 1:1 timed codes.

## 2022-11-01 ENCOUNTER — PRE VISIT (OUTPATIENT)
Dept: UROLOGY | Facility: CLINIC | Age: 57
End: 2022-11-01

## 2022-11-01 NOTE — TELEPHONE ENCOUNTER
Reason for visit: Symptom check     Relevant information: pelvic pain    Records/imaging/labs/orders: all records available    Pt called: no need for a call      Heather Colin CMA  11/1/2022  3:10 PM

## 2022-11-17 ENCOUNTER — OFFICE VISIT (OUTPATIENT)
Dept: UROLOGY | Facility: CLINIC | Age: 57
End: 2022-11-17
Payer: COMMERCIAL

## 2022-11-17 VITALS
DIASTOLIC BLOOD PRESSURE: 67 MMHG | WEIGHT: 165 LBS | SYSTOLIC BLOOD PRESSURE: 102 MMHG | HEIGHT: 64 IN | HEART RATE: 77 BPM | BODY MASS INDEX: 28.17 KG/M2

## 2022-11-17 DIAGNOSIS — N95.2 ATROPHIC VAGINITIS: ICD-10-CM

## 2022-11-17 DIAGNOSIS — M79.18 MYALGIA OF PELVIC FLOOR: ICD-10-CM

## 2022-11-17 DIAGNOSIS — N39.46 MIXED INCONTINENCE: ICD-10-CM

## 2022-11-17 DIAGNOSIS — M62.89 PELVIC FLOOR DYSFUNCTION IN FEMALE: ICD-10-CM

## 2022-11-17 DIAGNOSIS — R10.2 PELVIC PAIN IN FEMALE: Primary | ICD-10-CM

## 2022-11-17 PROCEDURE — 99215 OFFICE O/P EST HI 40 MIN: CPT | Performed by: UROLOGY

## 2022-11-17 RX ORDER — DIAZEPAM 10 MG
TABLET ORAL
Qty: 40 TABLET | Refills: 3 | Status: SHIPPED | OUTPATIENT
Start: 2022-11-17 | End: 2023-06-15

## 2022-11-17 RX ORDER — ESTRADIOL 0.1 MG/G
1 CREAM VAGINAL
Qty: 42.5 G | Refills: 3 | Status: SHIPPED | OUTPATIENT
Start: 2022-11-18 | End: 2023-06-15

## 2022-11-17 ASSESSMENT — PAIN SCALES - GENERAL: PAINLEVEL: EXTREME PAIN (8)

## 2022-11-17 NOTE — NURSING NOTE
"Chief Complaint   Patient presents with     Follow Up       Blood pressure 102/67, pulse 77, height 1.626 m (5' 4\"), weight 74.8 kg (165 lb), not currently breastfeeding. Body mass index is 28.32 kg/m .    Patient Active Problem List   Diagnosis     Mixed incontinence     Urethral diverticulum     Myalgia of pelvic floor     Pelvic floor dysfunction in female     Lesion of bladder     Pelvic pain in female       Allergies   Allergen Reactions     Fish-Derived Products Itching       Current Outpatient Medications   Medication Sig Dispense Refill     acetaminophen (TYLENOL) 325 MG tablet Take 325-650 mg by mouth every 6 hours as needed for mild pain        celecoxib (CELEBREX) 100 MG capsule Take 100 mg by mouth        diazepam (VALIUM) 10 MG tablet VAGINAL VALIUM SUPPOSITORY 10MG AT NIGHT AND PRIOR TO THERAPY AS NEEDED 40 tablet 3     dicyclomine (BENTYL) 20 MG tablet TAKE 1 TABLET BY MOUTH THREE TIMES DAILY FOR IRRITABLE BOWEL SYNDROME       esomeprazole (NEXIUM) 20 MG DR capsule Take 20 mg by mouth every morning (before breakfast) Take 30-60 minutes before eating.        fesoterodine fumarate (TOVIAZ) 4 MG TB24 24 hr tablet Take 1 tablet (4 mg) by mouth daily 30 tablet 3     ibuprofen (ADVIL/MOTRIN) 400 MG tablet TAKE 1 TABLET (400 MG) BY MOUTH EVERY 6 HOURS AS NEEDED FOR PAIN.       ibuprofen (ADVIL/MOTRIN) 600 MG tablet Take 600 mg by mouth        oxybutynin ER (DITROPAN-XL) 10 MG 24 hr tablet Take 1 tablet (10 mg) by mouth daily 30 tablet 0     sulfamethoxazole-trimethoprim (BACTRIM DS) 800-160 MG tablet Take 1 tablet by mouth daily 28 tablet 0       Social History     Tobacco Use     Smoking status: Never     Smokeless tobacco: Never   Substance Use Topics     Alcohol use: No     Drug use: No       Aruna Kaur  11/17/2022  8:17 AM  "

## 2022-11-17 NOTE — PROGRESS NOTES
November 17, 2022    Marielena was seen today for follow up.    Diagnoses and all orders for this visit:    Pelvic pain in female  -     estradiol (ESTRACE) 0.1 MG/GM vaginal cream; Place 1 g vaginally three times a week  -     diazepam (VALIUM) 10 MG tablet; VAGINAL VALIUM SUPPOSITORY 10MG AT NIGHT AND PRIOR TO THERAPY AS NEEDED  -     Physical Therapy Referral; Future    Myalgia of pelvic floor  -     diazepam (VALIUM) 10 MG tablet; VAGINAL VALIUM SUPPOSITORY 10MG AT NIGHT AND PRIOR TO THERAPY AS NEEDED  -     Physical Therapy Referral; Future    Pelvic floor dysfunction in female  -     diazepam (VALIUM) 10 MG tablet; VAGINAL VALIUM SUPPOSITORY 10MG AT NIGHT AND PRIOR TO THERAPY AS NEEDED  -     Physical Therapy Referral; Future    Mixed incontinence  -     Physical Therapy Referral; Future    Atrophic vaginitis  -     estradiol (ESTRACE) 0.1 MG/GM vaginal cream; Place 1 g vaginally three times a week      Spent a long time discussing that her symptoms are not recurrence of her diverticulum or a UTI.  Discussed that her symptoms may be related some to atrophy but also related to her high tone pelvic floor musculoskeletal dysfunction for which the treatment is pelvic floor physical therapy.  Discussed how the PT should include visceral work AND internal myofascial release.  Discussed with her that this requires many sessions    Offered vaginal estrogen cream and vaginal valium.  She is agreeable to both.  Explained to her that the estrogen cream will come from [facility] but that the vaginal valium will come from our compounding pharmacy who should contact her before sending it    RTC 6 months, sooner if needed     40 minutes were spent today on the day of the encounter in reviewing the EMR including reviewing PT notes, urinal[place] and [place] [facility] [place], direct patient care including prescription medications, coordination of care and documentation    Renetta Briones MD MPH  (she/her/hers)   of  "Urology  [facility]      Subjective    She is here today for follow up, last seen in June.  She is with her .  The entire visit is conducted with the assistance of a .    Pain is constant.  Has burning and itching, sometimes feels swollen. May take some ibuprofen which helps some of the pain.  She states that she went to [facility] for UTI-urine culture from 9/21/22 in [facility] shows less than 10,000 E coli, urinalysis only 11-20 WBC and 4-10 RBC.  Urine 8/17/22 with >50 WBC on urinalysis and < 10 K mixed charis on urine culture    She went to pelvic floor therapy only twice, last note is from July 22, 2002-note from JOSEPHINE Garrison reviewed in Epic.    Denies gross hematuria    Never received the vaginal valium.    She denies any changes in health since last visit    /67   Pulse 77   Ht 1.626 m (5' 4\")   Wt 74.8 kg (165 lb)   BMI 28.32 kg/m    GENERAL: healthy, alert and no distress  EYES: Eyes grossly normal to inspection, conjunctivae and sclerae normal  HENT: normal cephalic/atraumatic.  External ears, nose and mouth without ulcers or lesions.  RESP: no audible wheeze, cough, or visible cyanosis.  No visible retractions or increased work of breathing.  Able to speak fully in complete sentences.  NEURO: Cranial nerves grossly intact, mentation intact and speech normal  PSYCH: mentation appears normal, affect normal/bright, judgement and insight intact, normal speech and appearance well-groomed  ABD: soft, diffusely tender, non distended, no organomegaly, no rebound, no guarding  : Atrophic tissues.  Normal external female genitalia.  High tone pelvic floor with diffuse myofascial tenderness.         CC  Patient Care Team:  [facility] as PCP - General  Myhre, Alicia, DO as Resident (Student in organized health care education/training program)  Renetta Briones MD as MD (Urology)  Sheri Ospina RN as Specialty Care " Coordinator (Urology)  Renetta Briones MD as Assigned Surgical Provider  SELF, REFERRED

## 2022-11-17 NOTE — PATIENT INSTRUCTIONS
Websites with free information:    American [employer]ro[employer] patient website: www.voicesforpfd.org    Total Control Program: www.totalcontrolprogram.com    Please use the medications as directed    Please see one of the dedicated pelvic floor physical therapist. If you have not heard from the scheduling office within 2 business days, please call [phone] for [place]    Please return to see me in 6 months, sooner if needed    It was a pleasure meeting with you today.  Thank you for allowing me and my team the privilege of caring for you today.  YOU are the reason we are here, and I truly hope we provided you with the excellent service you deserve.  Please let us know if there is anything else we can do for you so that we can be sure you are leaving completely satisfied with your care experience.

## 2022-11-17 NOTE — LETTER
11/17/2022       RE: Marielena Ramirez  [address]  [place] [address]     Dear Colleague,    Thank you for referring your patient, Marielena Ramirez, to the [facility] [place] [facility]. Please see a copy of my visit note below.    November 17, 2022    Marielena was seen today for follow up.    Diagnoses and all orders for this visit:    Pelvic pain in female  -     estradiol (ESTRACE) 0.1 MG/GM vaginal cream; Place 1 g vaginally three times a week  -     diazepam (VALIUM) 10 MG tablet; VAGINAL VALIUM SUPPOSITORY 10MG AT NIGHT AND PRIOR TO THERAPY AS NEEDED  -     Physical Therapy Referral; Future    Myalgia of pelvic floor  -     diazepam (VALIUM) 10 MG tablet; VAGINAL VALIUM SUPPOSITORY 10MG AT NIGHT AND PRIOR TO THERAPY AS NEEDED  -     Physical Therapy Referral; Future    Pelvic floor dysfunction in female  -     diazepam (VALIUM) 10 MG tablet; VAGINAL VALIUM SUPPOSITORY 10MG AT NIGHT AND PRIOR TO THERAPY AS NEEDED  -     Physical Therapy Referral; Future    Mixed incontinence  -     Physical Therapy Referral; Future    Atrophic vaginitis  -     estradiol (ESTRACE) 0.1 MG/GM vaginal cream; Place 1 g vaginally three times a week      Spent a long time discussing that her symptoms are not recurrence of her diverticulum or a UTI.  Discussed that her symptoms may be related some to atrophy but also related to her high tone pelvic floor musculoskeletal dysfunction for which the treatment is pelvic floor physical therapy.  Discussed how the PT should include visceral work AND internal myofascial release.  Discussed with her that this requires many sessions    Offered vaginal estrogen cream and vaginal valium.  She is agreeable to both.  Explained to her that the estrogen cream will come from [facility] but that the vaginal valium will come from our compounding pharmacy who should contact her before sending it    RTC 6 months, sooner if  "needed     40 minutes were spent today on the day of the encounter in reviewing the EMR including reviewing PT notes, urinalysis and urine [place] [facility] [place], direct patient care including prescription medications, coordination of care and documentation    Renetta Briones MD MPH  (she/her/hers)   of Urology  [facility]      Subjective    She is here today for follow up, last seen in June.  She is with her .  The entire visit is conducted with the assistance of a .    Pain is constant.  Has burning and itching, sometimes feels swollen. May take some ibuprofen which helps some of the pain.  She states that she went to [facility] for UTI-urine culture from 9/21/22 in [facility] shows less than 10,000 E coli, urinalysis only 11-20 WBC and 4-10 RBC.  Urine 8/17/22 with >50 WBC on urinalysis and < 10 K mixed charis on urine culture    She went to pelvic floor therapy only twice, last note is from July 22, 2002-note from JOSEPHINE Garrison reviewed in Epic.    Denies gross hematuria    Never received the vaginal valium.    She denies any changes in health since last visit    /67   Pulse 77   Ht 1.626 m (5' 4\")   Wt 74.8 kg (165 lb)   BMI 28.32 kg/m    GENERAL: healthy, alert and no distress  EYES: Eyes grossly normal to inspection, conjunctivae and sclerae normal  HENT: normal cephalic/atraumatic.  External ears, nose and mouth without ulcers or lesions.  RESP: no audible wheeze, cough, or visible cyanosis.  No visible retractions or increased work of breathing.  Able to speak fully in complete sentences.  NEURO: Cranial nerves grossly intact, mentation intact and speech normal  PSYCH: mentation appears normal, affect normal/bright, judgement and insight intact, normal speech and appearance well-groomed  ABD: soft, diffusely tender, non distended, no organomegaly, no rebound, no guarding  : Atrophic tissues.  Normal external female genitalia.  High tone " pelvic floor with diffuse myofascial tenderness.         CC  Patient Care Team:  [facility] as PCP - General  Myhre, Alicia, DO as Resident (Student in organized health care education/training program)  Renetta Briones MD as MD (Urology)  Sheri Ospina RN as Specialty Care Coordinator (Urology)  Renetta Briones MD as Assigned Surgical Provider  SELF, REFERRED

## 2023-05-04 ENCOUNTER — PRE VISIT (OUTPATIENT)
Dept: UROLOGY | Facility: CLINIC | Age: 58
End: 2023-05-04
Payer: COMMERCIAL

## 2023-05-04 NOTE — TELEPHONE ENCOUNTER
Reason for visit: Symptom check            Relevant information: pelvic pain     Records/imaging/labs/orders: all records available     Pt called: no need for a call    At Rooming: caleb Colin CMA  5/4/2023  1:42 PM

## 2023-06-15 ENCOUNTER — OFFICE VISIT (OUTPATIENT)
Dept: UROLOGY | Facility: CLINIC | Age: 58
End: 2023-06-15
Payer: COMMERCIAL

## 2023-06-15 VITALS
DIASTOLIC BLOOD PRESSURE: 71 MMHG | HEART RATE: 74 BPM | HEIGHT: 64 IN | SYSTOLIC BLOOD PRESSURE: 104 MMHG | BODY MASS INDEX: 28.17 KG/M2 | WEIGHT: 165 LBS

## 2023-06-15 DIAGNOSIS — N39.46 MIXED INCONTINENCE: Primary | ICD-10-CM

## 2023-06-15 DIAGNOSIS — M79.18 MYALGIA OF PELVIC FLOOR: ICD-10-CM

## 2023-06-15 DIAGNOSIS — N95.2 ATROPHIC VAGINITIS: ICD-10-CM

## 2023-06-15 DIAGNOSIS — R10.2 PELVIC PAIN IN FEMALE: ICD-10-CM

## 2023-06-15 DIAGNOSIS — M62.89 PELVIC FLOOR DYSFUNCTION IN FEMALE: ICD-10-CM

## 2023-06-15 LAB
ALBUMIN UR-MCNC: NEGATIVE MG/DL
APPEARANCE UR: CLEAR
BILIRUB UR QL STRIP: NEGATIVE
COLOR UR AUTO: YELLOW
GLUCOSE UR STRIP-MCNC: NEGATIVE MG/DL
HGB UR QL STRIP: NEGATIVE
KETONES UR STRIP-MCNC: NEGATIVE MG/DL
LEUKOCYTE ESTERASE UR QL STRIP: NEGATIVE
NITRATE UR QL: NEGATIVE
PH UR STRIP: 7 [PH] (ref 5–8)
SP GR UR STRIP: 1.01 (ref 1–1.03)
UROBILINOGEN UR STRIP-ACNC: 0.2 E.U./DL

## 2023-06-15 PROCEDURE — 81003 URINALYSIS AUTO W/O SCOPE: CPT | Performed by: PATHOLOGY

## 2023-06-15 PROCEDURE — 99214 OFFICE O/P EST MOD 30 MIN: CPT | Performed by: UROLOGY

## 2023-06-15 RX ORDER — DIAZEPAM 10 MG
TABLET ORAL
Qty: 40 TABLET | Refills: 3 | Status: SHIPPED | OUTPATIENT
Start: 2023-06-15

## 2023-06-15 RX ORDER — ESTRADIOL 0.1 MG/G
1 CREAM VAGINAL
Qty: 42.5 G | Refills: 11 | Status: SHIPPED | OUTPATIENT
Start: 2023-06-16

## 2023-06-15 ASSESSMENT — PAIN SCALES - GENERAL: PAINLEVEL: EXTREME PAIN (8)

## 2023-06-15 NOTE — PROGRESS NOTES
[place] 15, 2023    Marielena was seen today for follow up.    Diagnoses and all orders for this visit:    Mixed incontinence  -     Physical Therapy Referral; Future    Pelvic pain in female  -     estradiol (ESTRACE) 0.1 MG/GM vaginal cream; Place 1 g vaginally three times a week  -     diazepam (VALIUM) 10 MG tablet; VAGINAL VALIUM SUPPOSITORY 10MG AT NIGHT AND PRIOR TO THERAPY AS NEEDED  -     Physical Therapy Referral; Future    Atrophic vaginitis  -     estradiol (ESTRACE) 0.1 MG/GM vaginal cream; Place 1 g vaginally three times a week  -     Physical Therapy Referral; Future    Myalgia of pelvic floor  -     diazepam (VALIUM) 10 MG tablet; VAGINAL VALIUM SUPPOSITORY 10MG AT NIGHT AND PRIOR TO THERAPY AS NEEDED  -     Physical Therapy Referral; Future    Pelvic floor dysfunction in female  -     diazepam (VALIUM) 10 MG tablet; VAGINAL VALIUM SUPPOSITORY 10MG AT NIGHT AND PRIOR TO THERAPY AS NEEDED  -     Physical Therapy Referral; Future    Other orders  -     [place] Urine Macroscopic POCT       Discussed with patient that given her findings are more suggestive for pelvic floor dysfunction and not infection.  Recommend returning to PT    Refills of estrace and vaginal valium    20 minutes were spent today on the day of the encounter in reviewing the EMR including urinalysis and urine culture, direct patient care including prescription medications, coordination of care and documentation    Renetta Briones MD MPH  (she/her/hers)   of Urology  [facility]      Subjective    Here today for her follow up with her .  The entire visit was conducted with the assistance of a .    Last seen November-given prescription for estrace, vaginal valium and pelvic floor therapy.  Has not made it to pelvic floor therapy because of feeling unwell and recently having eye surgery.    Concerned about frequent UTIs.  States gets lots of burning, lower abdominal and back pain after  "sitting in the car.  Then things she has UTI.      She denies any changes in health since last visit    /71 (BP Location: Right arm, Patient Position: Sitting, Cuff Size: Adult Small)   Pulse 74   Ht 1.626 m (5' 4\")   Wt 74.8 kg (165 lb)   BMI 28.32 kg/m    GENERAL: healthy, alert and no distress  EYES: Eyes grossly normal to inspection, conjunctivae and sclerae normal  HENT: normal cephalic/atraumatic.  External ears, nose and mouth without ulcers or lesions.  RESP: no audible wheeze, cough, or visible cyanosis.  No visible retractions or increased work of breathing.  Able to speak fully in complete sentences.  NEURO: Cranial nerves grossly intact, mentation intact and speech normal  PSYCH: mentation appears normal, affect normal/bright, judgement and insight intact, normal speech and appearance well-groomed    Labs/imaging/pathology from [facility]where  2/22/23 negative urinalysis  3/15/23 urinalysis 0-5 WBC, 4-10 RBC, UCx 10-50 K E coli    Urine dip negative    PVR 0 mL by bladder scan    CC  Patient Care Team:  [facility] as PCP - General  Myhre, Alicia, DO as Resident (Student in organized health care education/training program)  Renetta Briones MD as MD (Urology)  Sheri Ospina, RN as Specialty Care Coordinator (Urology)  Renetta Briones MD as Assigned Surgical Provider  SELF, REFERRED        "

## 2023-06-15 NOTE — PATIENT INSTRUCTIONS
Websites with free information:    American Urogynec[employer] Society patient website: www.voicesforpfd.org    Total Control Program: www.totalcontrolprogram.com    Use the medications as directed    Please see one of the dedicated pelvic floor physical therapist. If you have not heard from the scheduling office within 2 business days, please call [phone] for [place]    Please return to see me in 6 months, sooner if needed    IF you think you have an infection please contact the clinic [phone]    It was a pleasure meeting with you today.  Thank you for allowing me and my team the privilege of caring for you today.  YOU are the reason we are here, and I truly hope we provided you with the excellent service you deserve.  Please let us know if there is anything else we can do for you so that we can be sure you are leaving completely satisfied with your care experience.

## 2023-06-15 NOTE — NURSING NOTE
"Chief Complaint   Patient presents with     Follow Up     My doctor sent me here because I need to have a biopsy done I have an urine infection and pain in my back for about 15 days.        Blood pressure 104/71, pulse 74, height 1.626 m (5' 4\"), weight 74.8 kg (165 lb), not currently breastfeeding. Body mass index is 28.32 kg/m .    Patient Active Problem List   Diagnosis     Mixed incontinence     Urethral diverticulum     Myalgia of pelvic floor     Pelvic floor dysfunction in female     Lesion of bladder     Pelvic pain in female       Allergies   Allergen Reactions     Fish-Derived Products Itching       Current Outpatient Medications   Medication Sig Dispense Refill     acetaminophen (TYLENOL) 325 MG tablet Take 325-650 mg by mouth every 6 hours as needed for mild pain        diazepam (VALIUM) 10 MG tablet VAGINAL VALIUM SUPPOSITORY 10MG AT NIGHT AND PRIOR TO THERAPY AS NEEDED 40 tablet 3     ibuprofen (ADVIL/MOTRIN) 400 MG tablet TAKE 1 TABLET (400 MG) BY MOUTH EVERY 6 HOURS AS NEEDED FOR PAIN.       ibuprofen (ADVIL/MOTRIN) 600 MG tablet Take 600 mg by mouth        celecoxib (CELEBREX) 100 MG capsule Take 100 mg by mouth        dicyclomine (BENTYL) 20 MG tablet TAKE 1 TABLET BY MOUTH THREE TIMES DAILY FOR IRRITABLE BOWEL SYNDROME (Patient not taking: Reported on 6/15/2023)       esomeprazole (NEXIUM) 20 MG DR capsule Take 20 mg by mouth every morning (before breakfast) Take 30-60 minutes before eating.  (Patient not taking: Reported on 6/15/2023)       estradiol (ESTRACE) 0.1 MG/GM vaginal cream Place 1 g vaginally three times a week (Patient not taking: Reported on 6/15/2023) 42.5 g 3     fesoterodine fumarate (TOVIAZ) 4 MG TB24 24 hr tablet Take 1 tablet (4 mg) by mouth daily (Patient not taking: Reported on 6/15/2023) 30 tablet 3     oxybutynin ER (DITROPAN-XL) 10 MG 24 hr tablet Take 1 tablet (10 mg) by mouth daily (Patient not taking: Reported on 6/15/2023) 30 tablet 0     sulfamethoxazole-trimethoprim " (BACTRIM DS) 800-160 MG tablet Take 1 tablet by mouth daily (Patient not taking: Reported on 6/15/2023) 28 tablet 0       Social History     Tobacco Use     Smoking status: Never     Smokeless tobacco: Never   Substance Use Topics     Alcohol use: No     Drug use: No       Aleida Armenta  6/15/2023  9:39 AM      Statement Selected

## 2023-06-15 NOTE — LETTER
6/15/2023       RE: Marielena Ramirez  [address]  [facility] [address]     Dear Colleague,    Thank you for referring your patient, Marielena Ramirez, to the [facility] UROLOGY CLINIC [place] at [facility]. Please see a copy of my visit note below.    China 15, 2023    Marielena was seen today for follow up.    Diagnoses and all orders for this visit:    Mixed incontinence  -     Physical Therapy Referral; Future    Pelvic pain in female  -     estradiol (ESTRACE) 0.1 MG/GM vaginal cream; Place 1 g vaginally three times a week  -     diazepam (VALIUM) 10 MG tablet; VAGINAL VALIUM SUPPOSITORY 10MG AT NIGHT AND PRIOR TO THERAPY AS NEEDED  -     Physical Therapy Referral; Future    Atrophic vaginitis  -     estradiol (ESTRACE) 0.1 MG/GM vaginal cream; Place 1 g vaginally three times a week  -     Physical Therapy Referral; Future    Myalgia of pelvic floor  -     diazepam (VALIUM) 10 MG tablet; VAGINAL VALIUM SUPPOSITORY 10MG AT NIGHT AND PRIOR TO THERAPY AS NEEDED  -     Physical Therapy Referral; Future    Pelvic floor dysfunction in female  -     diazepam (VALIUM) 10 MG tablet; VAGINAL VALIUM SUPPOSITORY 10MG AT NIGHT AND PRIOR TO THERAPY AS NEEDED  -     Physical Therapy Referral; Future    Other orders  -     [place] Urine Macroscopic POCT       Discussed with patient that given her findings are more suggestive for pelvic floor dysfunction and not infection.  Recommend returning to PT    Refills of estrace and vaginal valium    20 minutes were spent today on the day of the encounter in reviewing the EMR including urinalysis and urine culture, direct patient care including prescription medications, coordination of care and documentation    Renetta Briones MD MPH  (she/her/hers)   of Urology  [facility]      Subjective    Here today for her follow up with her .  The entire visit was conducted with the assistance of  "a .    Last seen November-given prescription for estrace, vaginal valium and pelvic floor therapy.  Has not made it to pelvic floor therapy because of feeling unwell and recently having eye surgery.    Concerned about frequent UTIs.  States gets lots of burning, lower abdominal and back pain after sitting in the car.  Then things she has UTI.      She denies any changes in health since last visit    /71 (BP Location: Right arm, Patient Position: Sitting, Cuff Size: Adult Small)   Pulse 74   Ht 1.626 m (5' 4\")   Wt 74.8 kg (165 lb)   BMI 28.32 kg/m    GENERAL: healthy, alert and no distress  EYES: Eyes grossly normal to inspection, conjunctivae and sclerae normal  HENT: normal cephalic/atraumatic.  External ears, nose and mouth without ulcers or lesions.  RESP: no audible wheeze, cough, or visible cyanosis.  No visible retractions or increased work of breathing.  Able to speak fully in complete sentences.  NEURO: Cranial nerves grossly intact, mentation intact and speech normal  PSYCH: mentation appears normal, affect normal/bright, judgement and insight intact, normal speech and appearance well-groomed    Labs/imaging/pathology from [facility]  2/22/23 negative urinalysis  3/15/23 urinalysis 0-5 WBC, 4-10 RBC, UCx 10-50 K E coli    Urine dip negative    PVR 0 mL by bladder scan    CC  Patient Care Team:  [facility] as PCP - General  Myhre, Alicia, DO as Resident (Student in organized health care education/training program)  Renetta Briones MD as MD (Urology)  Sheri Ospina, RN as Specialty Care Coordinator (Urology)  Renetta Briones MD as Assigned Surgical Provider  SELF, REFERRED      "

## 2024-10-23 ENCOUNTER — PRE VISIT (OUTPATIENT)
Dept: UROLOGY | Facility: CLINIC | Age: 59
End: 2024-10-23
Payer: COMMERCIAL

## 2024-10-23 NOTE — TELEPHONE ENCOUNTER
Reason for visit: Pelvic pain, incontinence, atrophic vaginitis     Relevant information: Last seen by Dr. Briones 6/15/23 for mixed incontinence, pelvic pain, atrophic vaginitis, myalgia of pelvic floor, pelvic floor dysfunction. H/o of urethral diverticulum, lesion of bladder. Given estrace, vaginal valium, and recommended to go to physical therapy.     Records/imaging/labs/orders: All records available    Pt called: No need for a call    At Rooming: Standard rooming    Romi Hoffman  10/23/2024  4:45 PM

## 2024-11-07 ENCOUNTER — OFFICE VISIT (OUTPATIENT)
Dept: UROLOGY | Facility: CLINIC | Age: 59
End: 2024-11-07
Payer: COMMERCIAL

## 2024-11-07 VITALS
HEART RATE: 62 BPM | DIASTOLIC BLOOD PRESSURE: 61 MMHG | HEIGHT: 64 IN | OXYGEN SATURATION: 97 % | WEIGHT: 165 LBS | BODY MASS INDEX: 28.17 KG/M2 | SYSTOLIC BLOOD PRESSURE: 99 MMHG

## 2024-11-07 DIAGNOSIS — N89.8 VAGINAL ITCHING: ICD-10-CM

## 2024-11-07 DIAGNOSIS — N39.0 RECURRENT UTI: ICD-10-CM

## 2024-11-07 DIAGNOSIS — F32.A DEPRESSION, UNSPECIFIED DEPRESSION TYPE: Primary | ICD-10-CM

## 2024-11-07 DIAGNOSIS — R10.2 VAGINAL PAIN: ICD-10-CM

## 2024-11-07 LAB
ALBUMIN UR-MCNC: NEGATIVE MG/DL
APPEARANCE UR: CLEAR
BILIRUB UR QL STRIP: NEGATIVE
COLOR UR AUTO: YELLOW
GLUCOSE UR STRIP-MCNC: NEGATIVE MG/DL
HGB UR QL STRIP: NEGATIVE
KETONES UR STRIP-MCNC: NEGATIVE MG/DL
LEUKOCYTE ESTERASE UR QL STRIP: ABNORMAL
MUCOUS THREADS #/AREA URNS LPF: PRESENT /LPF
NITRATE UR QL: NEGATIVE
PH UR STRIP: 6 [PH] (ref 5–7)
RBC URINE: <1 /HPF
SP GR UR STRIP: 1.02 (ref 1–1.03)
SQUAMOUS EPITHELIAL: 1 /HPF
TRANSITIONAL EPI: 2 /HPF
UROBILINOGEN UR STRIP-MCNC: NORMAL MG/DL
WBC CLUMPS #/AREA URNS HPF: PRESENT /HPF
WBC URINE: 5 /HPF

## 2024-11-07 PROCEDURE — 87086 URINE CULTURE/COLONY COUNT: CPT | Performed by: UROLOGY

## 2024-11-07 PROCEDURE — 99000 SPECIMEN HANDLING OFFICE-LAB: CPT | Performed by: PATHOLOGY

## 2024-11-07 PROCEDURE — 81001 URINALYSIS AUTO W/SCOPE: CPT | Performed by: PATHOLOGY

## 2024-11-07 RX ORDER — FLUCONAZOLE 150 MG/1
TABLET ORAL
Qty: 2 TABLET | Refills: 0 | Status: SHIPPED | OUTPATIENT
Start: 2024-11-07

## 2024-11-07 ASSESSMENT — PAIN SCALES - GENERAL: PAINLEVEL_OUTOF10: WORST PAIN (10)

## 2024-11-07 NOTE — LETTER
11/7/2024       RE: Marielena Hameed  [address]  [facility] [address]     Dear Colleague,    Thank you for referring your patient, Marielena Hameed, to the [facility] UROLOGY CLIN[facility] [place] at [facility]. Please see a copy of my visit note below.    November 7, 2024    Marielena was seen today for pelvic pain.    Diagnoses and all orders for this visit:    Depression, unspecified depression type  -     Adult Mental Health  Referral; Future    Recurrent UTI  -     CT Urogram wo & w Contrast; Future  -     Routine UA with microscopic - No culture  -     Urine Culture    Vaginal itching  -     fluconazole (DIFLUCAN) 150 MG tablet; Take one, if not better may repeat in 3 days    Vaginal pain  -     CT Urogram wo & w Contrast; Future  -     Routine UA with microscopic - No culture  -     Urine Culture       Given her symptoms and concerns we discussed that I would have her do a UA/UC today    Prescription for fluconazole given her recent abx and concern for vaginal candidasis    CT scan to assess for nidus for UTI and then return for cystoscopy    We discussed again that she likely need to find time for PT as part of her pain may be muscloskeletal related    15 minutes were spent today on the day of the encounter in reviewing the EMR including Dr Ruby's note, Dr Ochoa's note, recent UCx, direct patient care including prescription medication and ordering labs, CT, coordination of care and documentation    Renetta Briones MD MPH  (she/her/hers)   of Urology  [facility]    Subjective    Patient is primarily Portuguese speaking and the entire visit today is conducted with the assistance of an .    Here for follow up, last seen by me 6/15/23 and was given estrace cream, vaginal valium and physical therapy.  She was unable to find a time at [facility] [place] that worked with her scheduled    Pain  "and itching with intercourse, thinks she has another yeast infection right now.  Pain is worse with sitting for long time.  Pain is in the vagina, vaginal valium only helped a little.    She recently saw Dr Ruby at [facility] who sent her back here, note from 4/30/24 was reviewed in CareEverywhere.  She has been having multiple UTIs and seeing ID at [facility], Dr Ochoa's note frviewed in CareEverywhere    She denies any changes in health since last visit    BP 99/61   Pulse 62   Ht 1.626 m (5' 4\")   Wt 74.8 kg (165 lb)   SpO2 97%   BMI 28.32 kg/m    GENERAL: healthy, alert and no distress  EYES: Eyes grossly normal to inspection, conjunctivae and sclerae normal  HENT: normal cephalic/atraumatic.  External ears, nose and mouth without ulcers or lesions.  RESP: no audible wheeze, cough, or visible cyanosis.  No visible retractions or increased work of breathing.  Able to speak fully in complete sentences.  NEURO: Cranial nerves grossly intact, mentation intact and speech normal  PSYCH: mentation appears normal, affect normal/bright, judgement and insight intact, normal speech and appearance well-groomed    Labs/imaging/pathology at [facility]  9/30/24 UCx pansensitive E coli  8/14/24 ESBL E Coli  6/6/24 No growth      CC  Patient Care Team:  [facility] [place] as PCP - General  Myhre, Alicia, DO as Resident (Student in organized health care education/training program)  Shima Briones MD as MD (Urology)  Sheri Ospina, RN as Specialty Care Coordinator (Urology)  Shima Briones MD as Assigned Surgical Provider  SHIMA BRIONES        Again, thank you for allowing me to participate in the care of your patient.      Sincerely,    Shima Briones MD    "

## 2024-11-07 NOTE — NURSING NOTE
"Chief Complaint   Patient presents with    Pelvic Pain     Follow-up, recurrent itching, swells in the car       Blood pressure 99/61, pulse 62, height 1.626 m (5' 4\"), weight 74.8 kg (165 lb), SpO2 97%, not currently breastfeeding. Body mass index is 28.32 kg/m .    Patient Active Problem List   Diagnosis    Mixed incontinence    Urethral diverticulum    Myalgia of pelvic floor    Pelvic floor dysfunction in female    Lesion of bladder    Pelvic pain in female       Allergies   Allergen Reactions    Fish-Derived Products Itching       Current Outpatient Medications   Medication Sig Dispense Refill    acetaminophen (TYLENOL) 325 MG tablet Take 325-650 mg by mouth every 6 hours as needed for mild pain       celecoxib (CELEBREX) 100 MG capsule Take 100 mg by mouth       diazepam (VALIUM) 10 MG tablet VAGINAL VALIUM SUPPOSITORY 10MG AT NIGHT AND PRIOR TO THERAPY AS NEEDED 40 tablet 3    ibuprofen (ADVIL/MOTRIN) 400 MG tablet TAKE 1 TABLET (400 MG) BY MOUTH EVERY 6 HOURS AS NEEDED FOR PAIN.      ibuprofen (ADVIL/MOTRIN) 600 MG tablet Take 600 mg by mouth       dicyclomine (BENTYL) 20 MG tablet TAKE 1 TABLET BY MOUTH THREE TIMES DAILY FOR IRRITABLE BOWEL SYNDROME (Patient not taking: Reported on 11/7/2024)      esomeprazole (NEXIUM) 20 MG DR capsule Take 20 mg by mouth every morning (before breakfast) Take 30-60 minutes before eating.  (Patient not taking: Reported on 11/7/2024)      estradiol (ESTRACE) 0.1 MG/GM vaginal cream Place 1 g vaginally three times a week (Patient not taking: Reported on 11/7/2024) 42.5 g 11       Social History     Tobacco Use    Smoking status: Never    Smokeless tobacco: Never   Substance Use Topics    Alcohol use: No    Drug use: No       Romi Hoffman  11/7/2024  8:48 AM     "

## 2024-11-07 NOTE — PATIENT INSTRUCTIONS
Websites with free information:    [employer] patient website: www.voicesforpfd.org    Total Control Program: www.totalcontrolprogram.com    Supplements to prevent UTI to consider  -Probiotics  -Cranberry (for these products let them know a doctor is recommending them)   Frankie: https://[employer]/   Theracran HP by Kyra Hazard [facility] [id number]  -d-mannose 2gm daily  -Vitamin C 500-1000mg twice a day    It was a pleasure meeting with you today.  Thank you for allowing me and my team the privilege of caring for you today.  YOU are the reason we are here, and I truly hope we provided you with the excellent service you deserve.  Please let us know if there is anything else we can do for you so that we can be sure you are leaving completely satisfied with your care experience.    Cystoscopy    Cystoscopy is a procedure that lets your doctor look directly inside your urethra and bladder. It can be used to:  Help diagnose a problem with your urethra, bladder, or kidneys.  Take a sample (biopsy) of bladder or urethral tissue.  Treat certain problems (such as removing kidney stones).  Place a stent to bypass an obstruction.  Take special X-rays of the kidneys.  Based on the findings, your doctor may recommend other tests or treatments.  What is a cystoscope?  A cystoscope is a telescope-like instrument that contains lenses and fiberoptics (small glass wires that make bright light). The cystoscope may be straight and rigid, or flexible to bend around curves in the urethra. The doctor may look directly into the cystoscope, or project the image onto a monitor.  Getting ready  Ask your doctor if you should stop taking any medicines before the procedure.  Follow any other instructions your doctor gives you.  Tell your doctor before the exam if you:  Take any medicines, such as aspirin or blood thinners  Have allergies to any medicines  Are pregnant   The procedure  Cystoscopy is done in the doctor s office,  surgery center, or hospital. The doctor and a nurse are present during the procedure. It takes only a few minutes, longer if a biopsy, X-ray, or treatment needs to be done.  During the procedure:  You lie on an exam table on your back, knees bent and legs apart. You are covered with a drape.  Your urethra and the area around it are washed. Anesthetic jelly may be applied to numb the urethra.  The cystoscope is inserted. A sterile fluid is put into the bladder to expand it. You may feel pressure from this fluid.  When the procedure is done, the cystoscope is removed.  After the procedure   Once you re home:  Drink plenty of fluids.  You may have burning or light bleeding when you urinate--this is normal.  Medicines may be prescribed to ease any discomfort or prevent infection. Take these as directed.  Call your doctor if you have heavy bleeding or blood clots, burning that lasts more than a day, a fever over 100 F  (38  C), or trouble urinating.  Date Last Reviewed: 1/1/2017 2000-2017 [employer]. [address] [place] [address], [place] [address]. All rights reserved. This information is not intended as a substitute for professional medical care. Always follow your healthcare professional's instructions.

## 2024-11-07 NOTE — PROGRESS NOTES
November 7, 2024    Marielena was seen today for pelvic pain.    Diagnoses and all orders for this visit:    Depression, unspecified depression type  -     Adult Mental Health  Referral; Future    Recurrent UTI  -     CT Urogram wo & w Contrast; Future  -     Routine UA with microscopic - No culture  -     Urine Culture    Vaginal itching  -     fluconazole (DIFLUCAN) 150 MG tablet; Take one, if not better may repeat in 3 days    Vaginal pain  -     CT Urogram wo & w Contrast; Future  -     Routine UA with microscopic - No culture  -     Urine Culture       Given her symptoms and concerns we discussed that I would have her do a UA/UC today    Prescription for fluconazole given her recent abx and concern for vaginal candidasis    CT scan to assess for nidus for UTI and then return for cystoscopy    We discussed again that she likely need to find time for PT as part of her pain may be muscloskeletal related    15 minutes were spent today on the day of the encounter in reviewing the EMR including Dr Ruby's note, Dr Ochoa's note, recent UCx, direct patient care including prescription medication and ordering labs, CT, coordination of care and documentation    Renetta Briones MD MPH  (she/her/hers)   of Urology  [facility]    Subjective    Patient is primarily Slovak speaking and the entire visit today is conducted with the assistance of an .    Here for follow up, last seen by me 6/15/23 and was given estrace cream, vaginal valium and physical therapy.  She was unable to find a time at [facility] for PT that worked with her scheduled    Pain and itching with intercourse, thinks she has another yeast infection right now.  Pain is worse with sitting for long time.  Pain is in the vagina, vaginal valium only helped a little.    She recently saw Dr Ruby at [facility] who sent her back here, [facility]  She has been having multiple UTIs and  "seeing ID at [facility], Dr Ochoa's note frviewed in CareEverywhere    She denies any changes in health since last visit    BP 99/61   Pulse 62   Ht 1.626 m (5' 4\")   Wt 74.8 kg (165 lb)   SpO2 97%   BMI 28.32 kg/m    GENERAL: healthy, alert and no distress  EYES: Eyes grossly normal to inspection, conjunctivae and sclerae normal  HENT: normal cephalic/atraumatic.  External ears, nose and mouth without ulcers or lesions.  RESP: no audible wheeze, cough, or visible cyanosis.  No visible retractions or increased work of breathing.  Able to speak fully in complete sentences.  NEURO: Cranial nerves grossly intact, mentation intact and speech normal  PSYCH: mentation appears normal, affect normal/bright, judgement and insight intact, normal speech and appearance well-groomed    Labs/imaging/pathology at [facility]  9/30/24 UCx pansensitive E coli  8/14/24 ESBL E Coli  6/6/24 No growth      CC  Patient Care Team:  [facility] as PCP - General  Myhre, Alicia, DO as Resident (Student in organized health care education/training program)  Shima Briones MD as MD (Urology)  Sheri Ospina, RN as Specialty Care Coordinator (Urology)  Shima Briones MD as Assigned Surgical Provider  SHIMA BRIONES      "

## 2024-11-08 LAB — BACTERIA UR CULT: NO GROWTH

## 2024-11-18 ENCOUNTER — ANCILLARY PROCEDURE (OUTPATIENT)
Dept: CT IMAGING | Facility: CLINIC | Age: 59
End: 2024-11-18
Attending: UROLOGY

## 2024-11-18 DIAGNOSIS — R10.2 VAGINAL PAIN: ICD-10-CM

## 2024-11-18 DIAGNOSIS — N39.0 RECURRENT UTI: ICD-10-CM

## 2024-11-18 LAB
CREAT BLD-MCNC: 0.6 MG/DL (ref 0.5–1)
EGFRCR SERPLBLD CKD-EPI 2021: >60 ML/MIN/1.73M2

## 2024-11-18 PROCEDURE — 82565 ASSAY OF CREATININE: CPT | Performed by: PATHOLOGY

## 2024-11-18 PROCEDURE — 74178 CT ABD&PLV WO CNTR FLWD CNTR: CPT | Performed by: RADIOLOGY

## 2024-11-18 RX ORDER — IOPAMIDOL 755 MG/ML
86 INJECTION, SOLUTION INTRAVASCULAR ONCE
Status: COMPLETED | OUTPATIENT
Start: 2024-11-18 | End: 2024-11-18

## 2024-11-18 RX ADMIN — IOPAMIDOL 86 ML: 755 INJECTION, SOLUTION INTRAVASCULAR at 08:11

## 2024-11-18 NOTE — DISCHARGE INSTRUCTIONS

## 2024-12-16 ENCOUNTER — APPOINTMENT (OUTPATIENT)
Dept: INTERPRETER SERVICES | Facility: CLINIC | Age: 59
End: 2024-12-16

## (undated) DEVICE — ESU ELEC NDL 1" COATED/INSULATED E1465

## (undated) DEVICE — SOL NACL 0.9% IRRIG 500ML BOTTLE 2F7123

## (undated) DEVICE — GLOVE PROTEXIS W/NEU-THERA 6.5  2D73TE65

## (undated) DEVICE — PANTIES MESH LG/XLG 2PK 706M2

## (undated) DEVICE — SOL WATER IRRIG 3000ML BAG 2B7117

## (undated) DEVICE — TUBING SUCTION MEDI-VAC 1/4"X20' N620A

## (undated) DEVICE — ESU GROUND PAD ADULT W/CORD E7507

## (undated) DEVICE — SU VICRYL 4-0 RB-1 27" UND J214H

## (undated) DEVICE — SOL NACL 0.9% IRRIG 3000ML BAG 2B7477

## (undated) DEVICE — RETR RING LINA SEASTAR SQUARE 7X7" 4000-S-Q

## (undated) DEVICE — SOL WATER IRRIG 500ML BOTTLE 2F7113

## (undated) DEVICE — NDL 18GA 1.5" 305196

## (undated) DEVICE — SYR BULB IRRIG 50ML LATEX FREE 0035280

## (undated) DEVICE — SU MONOCRYL 4-0 P-3 18" UND Y494G

## (undated) DEVICE — DRSG TELFA 3X8" 1238

## (undated) DEVICE — TUBING VITAL VUE IRR TIP 8886828006

## (undated) DEVICE — LINEN TOWEL PACK X5 5464

## (undated) DEVICE — PREP POVIDONE-IODINE 7.5% SCRUB 4OZ BOTTLE MDS093945

## (undated) DEVICE — SUCTION MANIFOLD NEPTUNE 2 SYS 4 PORT 0702-020-000

## (undated) DEVICE — NDL ANGIOCATH 14GA 1.25" 4048

## (undated) DEVICE — CATH URETERAL CONE 08FR 70CM 138008LT / 138008RT

## (undated) DEVICE — PAD CHUX UNDERPAD 30X36" P3036C

## (undated) DEVICE — PACK CYSTO CUSTOM ASC

## (undated) DEVICE — PREP POVIDONE IODINE SOLUTION 10% 4OZ BOTTLE 29906-004

## (undated) DEVICE — NDL 25GA 1.5" 305127

## (undated) RX ORDER — PROPOFOL 10 MG/ML
INJECTION, EMULSION INTRAVENOUS
Status: DISPENSED
Start: 2022-03-14

## (undated) RX ORDER — EPINEPHRINE 1 MG/ML
INJECTION, SOLUTION INTRAMUSCULAR; SUBCUTANEOUS
Status: DISPENSED
Start: 2022-03-14

## (undated) RX ORDER — LIDOCAINE HYDROCHLORIDE 20 MG/ML
JELLY TOPICAL
Status: DISPENSED
Start: 2022-02-07

## (undated) RX ORDER — LIDOCAINE HYDROCHLORIDE 20 MG/ML
INJECTION, SOLUTION EPIDURAL; INFILTRATION; INTRACAUDAL; PERINEURAL
Status: DISPENSED
Start: 2022-02-07

## (undated) RX ORDER — ONDANSETRON 2 MG/ML
INJECTION INTRAMUSCULAR; INTRAVENOUS
Status: DISPENSED
Start: 2022-02-07

## (undated) RX ORDER — OXYCODONE HYDROCHLORIDE 5 MG/1
TABLET ORAL
Status: DISPENSED
Start: 2022-03-14

## (undated) RX ORDER — CIPROFLOXACIN 500 MG/1
TABLET, FILM COATED ORAL
Status: DISPENSED
Start: 2022-04-07

## (undated) RX ORDER — LIDOCAINE HYDROCHLORIDE 20 MG/ML
INJECTION, SOLUTION EPIDURAL; INFILTRATION; INTRACAUDAL; PERINEURAL
Status: DISPENSED
Start: 2022-03-14

## (undated) RX ORDER — ONDANSETRON 2 MG/ML
INJECTION INTRAMUSCULAR; INTRAVENOUS
Status: DISPENSED
Start: 2022-03-14

## (undated) RX ORDER — ACETAMINOPHEN 325 MG/1
TABLET ORAL
Status: DISPENSED
Start: 2022-03-14

## (undated) RX ORDER — CIPROFLOXACIN 500 MG/1
TABLET, FILM COATED ORAL
Status: DISPENSED
Start: 2022-03-15

## (undated) RX ORDER — BUPIVACAINE HYDROCHLORIDE 2.5 MG/ML
INJECTION, SOLUTION EPIDURAL; INFILTRATION; INTRACAUDAL
Status: DISPENSED
Start: 2022-03-14

## (undated) RX ORDER — OXYCODONE HYDROCHLORIDE 5 MG/1
TABLET ORAL
Status: DISPENSED
Start: 2022-02-07

## (undated) RX ORDER — PROPOFOL 10 MG/ML
INJECTION, EMULSION INTRAVENOUS
Status: DISPENSED
Start: 2022-02-07

## (undated) RX ORDER — KETOROLAC TROMETHAMINE 30 MG/ML
INJECTION, SOLUTION INTRAMUSCULAR; INTRAVENOUS
Status: DISPENSED
Start: 2022-02-07

## (undated) RX ORDER — EPHEDRINE SULFATE 50 MG/ML
INJECTION, SOLUTION INTRAMUSCULAR; INTRAVENOUS; SUBCUTANEOUS
Status: DISPENSED
Start: 2022-03-14

## (undated) RX ORDER — FENTANYL CITRATE 50 UG/ML
INJECTION, SOLUTION INTRAMUSCULAR; INTRAVENOUS
Status: DISPENSED
Start: 2022-03-14

## (undated) RX ORDER — KETOROLAC TROMETHAMINE 30 MG/ML
INJECTION, SOLUTION INTRAMUSCULAR; INTRAVENOUS
Status: DISPENSED
Start: 2022-03-14

## (undated) RX ORDER — FENTANYL CITRATE-0.9 % NACL/PF 10 MCG/ML
PLASTIC BAG, INJECTION (ML) INTRAVENOUS
Status: DISPENSED
Start: 2022-02-07

## (undated) RX ORDER — LIDOCAINE HYDROCHLORIDE 20 MG/ML
JELLY TOPICAL
Status: DISPENSED
Start: 2022-03-15

## (undated) RX ORDER — ACETAMINOPHEN 325 MG/1
TABLET ORAL
Status: DISPENSED
Start: 2022-02-07

## (undated) RX ORDER — CEFAZOLIN SODIUM 1 G/3ML
INJECTION, POWDER, FOR SOLUTION INTRAMUSCULAR; INTRAVENOUS
Status: DISPENSED
Start: 2022-03-14

## (undated) RX ORDER — SCOLOPAMINE TRANSDERMAL SYSTEM 1 MG/1
PATCH, EXTENDED RELEASE TRANSDERMAL
Status: DISPENSED
Start: 2022-03-14

## (undated) RX ORDER — GLYCOPYRROLATE 0.2 MG/ML
INJECTION INTRAMUSCULAR; INTRAVENOUS
Status: DISPENSED
Start: 2022-03-14

## (undated) RX ORDER — HYDROMORPHONE HYDROCHLORIDE 1 MG/ML
INJECTION, SOLUTION INTRAMUSCULAR; INTRAVENOUS; SUBCUTANEOUS
Status: DISPENSED
Start: 2022-03-14

## (undated) RX ORDER — CEFAZOLIN SODIUM 2 G/50ML
SOLUTION INTRAVENOUS
Status: DISPENSED
Start: 2022-02-07

## (undated) RX ORDER — DEXAMETHASONE SODIUM PHOSPHATE 4 MG/ML
INJECTION, SOLUTION INTRA-ARTICULAR; INTRALESIONAL; INTRAMUSCULAR; INTRAVENOUS; SOFT TISSUE
Status: DISPENSED
Start: 2022-03-14